# Patient Record
Sex: FEMALE | Race: WHITE | NOT HISPANIC OR LATINO | Employment: OTHER | URBAN - METROPOLITAN AREA
[De-identification: names, ages, dates, MRNs, and addresses within clinical notes are randomized per-mention and may not be internally consistent; named-entity substitution may affect disease eponyms.]

---

## 2020-12-15 ENCOUNTER — OFFICE VISIT (OUTPATIENT)
Dept: FAMILY MEDICINE | Facility: CLINIC | Age: 60
End: 2020-12-15
Payer: COMMERCIAL

## 2020-12-15 ENCOUNTER — PATIENT OUTREACH (OUTPATIENT)
Dept: FAMILY MEDICINE | Facility: CLINIC | Age: 60
End: 2020-12-15

## 2020-12-15 VITALS
DIASTOLIC BLOOD PRESSURE: 98 MMHG | HEART RATE: 82 BPM | WEIGHT: 156.75 LBS | SYSTOLIC BLOOD PRESSURE: 158 MMHG | HEIGHT: 65 IN | BODY MASS INDEX: 26.12 KG/M2 | RESPIRATION RATE: 16 BRPM | TEMPERATURE: 97 F | OXYGEN SATURATION: 98 %

## 2020-12-15 DIAGNOSIS — I10 ESSENTIAL HYPERTENSION: Chronic | ICD-10-CM

## 2020-12-15 DIAGNOSIS — R10.11 RIGHT UPPER QUADRANT ABDOMINAL PAIN: Primary | ICD-10-CM

## 2020-12-15 DIAGNOSIS — E87.6 LOW SERUM POTASSIUM: ICD-10-CM

## 2020-12-15 LAB
ALBUMIN SERPL BCP-MCNC: 4.2 G/DL (ref 3.5–5.2)
ALP SERPL-CCNC: 84 U/L (ref 55–135)
ALT SERPL W/O P-5'-P-CCNC: 17 U/L (ref 10–44)
ANION GAP SERPL CALC-SCNC: 11 MMOL/L (ref 8–16)
AST SERPL-CCNC: 20 U/L (ref 10–40)
BILIRUB SERPL-MCNC: 0.7 MG/DL (ref 0.1–1)
BUN SERPL-MCNC: 10 MG/DL (ref 6–20)
CALCIUM SERPL-MCNC: 9.5 MG/DL (ref 8.7–10.5)
CHLORIDE SERPL-SCNC: 100 MMOL/L (ref 95–110)
CO2 SERPL-SCNC: 28 MMOL/L (ref 23–29)
CREAT SERPL-MCNC: 0.7 MG/DL (ref 0.5–1.4)
EST. GFR  (AFRICAN AMERICAN): >60 ML/MIN/1.73 M^2
EST. GFR  (NON AFRICAN AMERICAN): >60 ML/MIN/1.73 M^2
GLUCOSE SERPL-MCNC: 114 MG/DL (ref 70–110)
POTASSIUM SERPL-SCNC: 4.3 MMOL/L (ref 3.5–5.1)
PROT SERPL-MCNC: 7.6 G/DL (ref 6–8.4)
SODIUM SERPL-SCNC: 139 MMOL/L (ref 136–145)

## 2020-12-15 PROCEDURE — 99999 PR PBB SHADOW E&M-EST. PATIENT-LVL IV: ICD-10-PCS | Mod: PBBFAC,,, | Performed by: FAMILY MEDICINE

## 2020-12-15 PROCEDURE — 99204 PR OFFICE/OUTPT VISIT, NEW, LEVL IV, 45-59 MIN: ICD-10-PCS | Mod: S$PBB,,, | Performed by: FAMILY MEDICINE

## 2020-12-15 PROCEDURE — 80053 COMPREHEN METABOLIC PANEL: CPT

## 2020-12-15 PROCEDURE — 99204 OFFICE O/P NEW MOD 45 MIN: CPT | Mod: S$PBB,,, | Performed by: FAMILY MEDICINE

## 2020-12-15 PROCEDURE — 99999 PR PBB SHADOW E&M-EST. PATIENT-LVL IV: CPT | Mod: PBBFAC,,, | Performed by: FAMILY MEDICINE

## 2020-12-15 RX ORDER — POTASSIUM CHLORIDE 750 MG/1
20 CAPSULE, EXTENDED RELEASE ORAL 2 TIMES DAILY
Qty: 120 CAPSULE | Refills: 11 | Status: SHIPPED | OUTPATIENT
Start: 2020-12-15 | End: 2021-12-15

## 2020-12-15 RX ORDER — DICYCLOMINE HYDROCHLORIDE 20 MG/1
20 TABLET ORAL 3 TIMES DAILY PRN
Qty: 60 TABLET | Refills: 1 | Status: SHIPPED | OUTPATIENT
Start: 2020-12-15 | End: 2020-12-15

## 2020-12-15 RX ORDER — POTASSIUM CHLORIDE 750 MG/1
CAPSULE, EXTENDED RELEASE ORAL
COMMUNITY
Start: 2020-12-04 | End: 2020-12-15 | Stop reason: SDUPTHER

## 2020-12-15 RX ORDER — DICYCLOMINE HYDROCHLORIDE 20 MG/1
20 TABLET ORAL 3 TIMES DAILY PRN
Qty: 60 TABLET | Refills: 1 | Status: SHIPPED | OUTPATIENT
Start: 2020-12-15 | End: 2021-01-14

## 2020-12-15 RX ORDER — METHOCARBAMOL 750 MG/1
TABLET, FILM COATED ORAL
COMMUNITY
Start: 2020-12-04 | End: 2023-08-29 | Stop reason: ALTCHOICE

## 2020-12-15 NOTE — PROGRESS NOTES
Population Health Outreach.  12/15/2020  efax snet to [facility] med red for most recent mammogram & colonoscopy results  [phone]  [phone]  efax sent to gp mem med rec for most recent fasting labs

## 2020-12-15 NOTE — LETTER
FAX      AUTHORIZATION FOR RELEASE OF   CONFIDENTIAL INFORMATION        [facility]      We are seeing Zoë Dupree, date of birth 1960, in the clinic at [facility]. ÓSCAR DOYLE MD is the patient's PCP. Zoë Dupree has an outstanding lab/procedure at the time we reviewed their chart. In order to help keep their health information updated, Zoë Dupree has authorized us to request the following medical record(s):         ( X )  MOST RECENT FASTING LAB RESULTS        Please fax records to [facility]  [phone]     If you have any questions, please contact Nena at [phone].      Nena Baldwin LPN  Performance Improvement Coordinator  [facility] [address] [place] [address] [phone]

## 2020-12-15 NOTE — LETTER
FAX      AUTHORIZATION FOR RELEASE OF   CONFIDENTIAL INFORMATION        [facility]      We are seeing Zoë Dupree, date of birth 1960, in the clinic at [facility]. ÓSCAR DOYLE MD is the patient's PCP. Zoë Dupree has an outstanding lab/procedure at the time we reviewed their chart. In order to help keep their health information updated, Zoë Dupree has authorized us to request the following medical record(s):        ( X )  MAMMOGRAM (WITH IN 2 YRS)          ( X)  COLONOSCOPY (WITH IN 10 YRS)      Please fax records to [facility]  [phone]     If you have any questions, please contact Nena at [phone].      Nena Baldwin LPN  Performance Improvement Coordinator  [facility]  [address] [place] [address], [place] [address] [phone]

## 2020-12-17 ENCOUNTER — TELEPHONE (OUTPATIENT)
Dept: FAMILY MEDICINE | Facility: CLINIC | Age: 60
End: 2020-12-17

## 2020-12-17 DIAGNOSIS — Z12.11 COLON CANCER SCREENING: ICD-10-CM

## 2020-12-17 DIAGNOSIS — Z12.31 OTHER SCREENING MAMMOGRAM: ICD-10-CM

## 2020-12-17 NOTE — TELEPHONE ENCOUNTER
----- Message from Flavia Ratliff sent at 12/17/2020 10:52 AM CST -----  Type:  Patient Returning Call    Who Called:  patient  Who Left Message for Patient:  Nena  Does the patient know what this is regarding?:  yes  Best Call Back Number:  [phone]  Additional Information:  requesting a call back

## 2020-12-29 ENCOUNTER — CLINICAL SUPPORT (OUTPATIENT)
Dept: FAMILY MEDICINE | Facility: CLINIC | Age: 60
End: 2020-12-29
Payer: COMMERCIAL

## 2020-12-29 ENCOUNTER — TELEPHONE (OUTPATIENT)
Dept: FAMILY MEDICINE | Facility: CLINIC | Age: 60
End: 2020-12-29

## 2020-12-29 VITALS — SYSTOLIC BLOOD PRESSURE: 140 MMHG | DIASTOLIC BLOOD PRESSURE: 90 MMHG

## 2020-12-29 PROCEDURE — 99999 PR PBB SHADOW E&M-EST. PATIENT-LVL I: CPT | Mod: PBBFAC,,,

## 2020-12-29 PROCEDURE — 99999 PR PBB SHADOW E&M-EST. PATIENT-LVL I: ICD-10-PCS | Mod: PBBFAC,,,

## 2020-12-29 NOTE — TELEPHONE ENCOUNTER
----- Message from Theresa Mireles sent at 12/29/2020  8:55 AM CST -----  Regarding: reschedule nurse visit  Contact: patient  Type:   Appointment Request      Name of Caller: patient    Best Call Back Number: [phone]    Additional Information:     Patient has nurse visit today at 9 am- has contractor coming,- would like to come by around 10 or 11 for bp check.   Called around 8:50 to change appt.

## 2020-12-29 NOTE — PROGRESS NOTES
Zoë Dupree 60 y.o. female is here today for Blood Pressure check.   History of HTN:yes     Review of patient's allergies indicates:  No Known Allergies  Creatinine   Date Value Ref Range Status   12/15/2020 0.7 0.5 - 1.4 mg/dL Final     Sodium   Date Value Ref Range Status   12/15/2020 139 136 - 145 mmol/L Final     Potassium   Date Value Ref Range Status   12/15/2020 4.3 3.5 - 5.1 mmol/L Final       Current Outpatient Medications:     dicyclomine (BENTYL) 20 mg tablet, Take 1 tablet (20 mg total) by mouth 3 (three) times daily as needed., Disp: 60 tablet, Rfl: 1    methocarbamoL (ROBAXIN) 750 MG Tab, TK 1 T PO  Q 4 H FOR 7 DAYS PRN P, Disp: , Rfl:     potassium chloride (MICRO-K) 10 MEQ CpSR, Take 2 capsules (20 mEq total) by mouth 2 (two) times daily., Disp: 120 capsule, Rfl: 11    Pt home bp readings:  12/22/20 148/84 (AM)       141/88(PM)    12/23/20 157/99(AM)         144/91(PM)    12/24/20 150/114(AM)       151/92(PM)    12/25/20 154/96(AM)       132/77(PM)    12/27/20 149/96(AM)      136/77(PM)    12/28/20 149/96(AM)      BP: (!) 140/90   Pt is asymptomatic.      Blood pressure reading after 15 minutes was 140/90.  Dr. Ferrari notified.

## 2020-12-30 ENCOUNTER — PATIENT MESSAGE (OUTPATIENT)
Dept: FAMILY MEDICINE | Facility: CLINIC | Age: 60
End: 2020-12-30

## 2020-12-30 ENCOUNTER — TELEPHONE (OUTPATIENT)
Dept: FAMILY MEDICINE | Facility: CLINIC | Age: 60
End: 2020-12-30

## 2020-12-30 PROBLEM — I10 ESSENTIAL HYPERTENSION: Status: ACTIVE | Noted: 2020-12-30

## 2020-12-30 RX ORDER — LOSARTAN POTASSIUM 50 MG/1
50 TABLET ORAL DAILY
COMMUNITY
End: 2021-01-04

## 2020-12-30 RX ORDER — OMEPRAZOLE 20 MG/1
20 CAPSULE, DELAYED RELEASE ORAL DAILY
COMMUNITY
End: 2023-08-29 | Stop reason: SDUPTHER

## 2020-12-30 NOTE — TELEPHONE ENCOUNTER
----- Message from Zara Cain sent at 12/29/2020  4:32 PM CST -----  Regarding: Pt Advice (Medication Change)  Contact: Patient  Type: Needs Medical Advice  Who Called: Patient  Best Call Back Number: [phone]  Additional Information: Patient stated she was seen this morning 12/29/20 due to High Blood Pressure and the patient is stating Dr. Ferrari was to call her and change her Medications due to her High Blood Pressure. Patient is concerned and would like a call back.

## 2021-01-04 ENCOUNTER — PATIENT OUTREACH (OUTPATIENT)
Dept: ADMINISTRATIVE | Facility: HOSPITAL | Age: 61
End: 2021-01-04

## 2021-01-04 ENCOUNTER — HOSPITAL ENCOUNTER (OUTPATIENT)
Dept: RADIOLOGY | Facility: HOSPITAL | Age: 61
Discharge: HOME | End: 2021-01-04
Attending: FAMILY MEDICINE
Payer: COMMERCIAL

## 2021-01-04 ENCOUNTER — OFFICE VISIT (OUTPATIENT)
Dept: FAMILY MEDICINE | Facility: CLINIC | Age: 61
End: 2021-01-04
Payer: COMMERCIAL

## 2021-01-04 VITALS
DIASTOLIC BLOOD PRESSURE: 100 MMHG | HEIGHT: 65 IN | WEIGHT: 157.63 LBS | HEART RATE: 84 BPM | TEMPERATURE: 98 F | RESPIRATION RATE: 15 BRPM | BODY MASS INDEX: 26.26 KG/M2 | SYSTOLIC BLOOD PRESSURE: 148 MMHG | OXYGEN SATURATION: 98 %

## 2021-01-04 DIAGNOSIS — E87.6 LOW SERUM POTASSIUM: ICD-10-CM

## 2021-01-04 DIAGNOSIS — I10 ESSENTIAL HYPERTENSION: Chronic | ICD-10-CM

## 2021-01-04 DIAGNOSIS — M79.10 MYALGIA: Primary | Chronic | ICD-10-CM

## 2021-01-04 DIAGNOSIS — R10.11 RIGHT UPPER QUADRANT ABDOMINAL PAIN: ICD-10-CM

## 2021-01-04 DIAGNOSIS — R10.11 RIGHT UPPER QUADRANT ABDOMINAL PAIN: Primary | ICD-10-CM

## 2021-01-04 DIAGNOSIS — R10.11 RIGHT UPPER QUADRANT PAIN: ICD-10-CM

## 2021-01-04 PROBLEM — M81.0 OSTEOPOROSIS: Status: ACTIVE | Noted: 2020-08-01

## 2021-01-04 PROCEDURE — 99999 PR PBB SHADOW E&M-EST. PATIENT-LVL IV: ICD-10-PCS | Mod: PBBFAC,,, | Performed by: FAMILY MEDICINE

## 2021-01-04 PROCEDURE — 99214 PR OFFICE/OUTPT VISIT, EST, LEVL IV, 30-39 MIN: ICD-10-PCS | Mod: S$GLB,,, | Performed by: FAMILY MEDICINE

## 2021-01-04 PROCEDURE — 76705 US ABDOMEN LIMITED_GALLBLADDER: ICD-10-PCS | Mod: 26,,, | Performed by: RADIOLOGY

## 2021-01-04 PROCEDURE — 99214 OFFICE O/P EST MOD 30 MIN: CPT | Mod: S$GLB,,, | Performed by: FAMILY MEDICINE

## 2021-01-04 PROCEDURE — 99999 PR PBB SHADOW E&M-EST. PATIENT-LVL IV: CPT | Mod: PBBFAC,,, | Performed by: FAMILY MEDICINE

## 2021-01-04 PROCEDURE — 76705 ECHO EXAM OF ABDOMEN: CPT | Mod: TC

## 2021-01-04 PROCEDURE — 76705 ECHO EXAM OF ABDOMEN: CPT | Mod: 26,,, | Performed by: RADIOLOGY

## 2021-01-04 RX ORDER — LOSARTAN POTASSIUM 100 MG/1
100 TABLET ORAL NIGHTLY
Qty: 30 TABLET | Refills: 2 | Status: SHIPPED | OUTPATIENT
Start: 2021-01-04 | End: 2021-04-10

## 2021-01-05 ENCOUNTER — LAB VISIT (OUTPATIENT)
Dept: FAMILY MEDICINE | Facility: CLINIC | Age: 61
End: 2021-01-05
Payer: COMMERCIAL

## 2021-01-05 ENCOUNTER — PATIENT OUTREACH (OUTPATIENT)
Dept: ADMINISTRATIVE | Facility: HOSPITAL | Age: 61
End: 2021-01-05

## 2021-01-05 DIAGNOSIS — M79.10 MYALGIA: ICD-10-CM

## 2021-01-05 DIAGNOSIS — E87.6 LOW SERUM POTASSIUM: ICD-10-CM

## 2021-01-05 PROBLEM — E83.42 HYPOMAGNESEMIA: Status: ACTIVE | Noted: 2021-01-05

## 2021-01-05 LAB
ALBUMIN SERPL BCP-MCNC: 3.9 G/DL (ref 3.5–5.2)
ALP SERPL-CCNC: 78 U/L (ref 55–135)
ALT SERPL W/O P-5'-P-CCNC: 15 U/L (ref 10–44)
ANION GAP SERPL CALC-SCNC: 8 MMOL/L (ref 8–16)
AST SERPL-CCNC: 20 U/L (ref 10–40)
BASOPHILS # BLD AUTO: 0.05 K/UL (ref 0–0.2)
BASOPHILS NFR BLD: 0.8 % (ref 0–1.9)
BILIRUB SERPL-MCNC: 0.6 MG/DL (ref 0.1–1)
BUN SERPL-MCNC: 10 MG/DL (ref 6–20)
CALCIUM SERPL-MCNC: 8.7 MG/DL (ref 8.7–10.5)
CHLORIDE SERPL-SCNC: 104 MMOL/L (ref 95–110)
CO2 SERPL-SCNC: 28 MMOL/L (ref 23–29)
CREAT SERPL-MCNC: 0.7 MG/DL (ref 0.5–1.4)
DIFFERENTIAL METHOD: NORMAL
EOSINOPHIL # BLD AUTO: 0.1 K/UL (ref 0–0.5)
EOSINOPHIL NFR BLD: 1.1 % (ref 0–8)
ERYTHROCYTE [DISTWIDTH] IN BLOOD BY AUTOMATED COUNT: 12.8 % (ref 11.5–14.5)
EST. GFR  (AFRICAN AMERICAN): >60 ML/MIN/1.73 M^2
EST. GFR  (NON AFRICAN AMERICAN): >60 ML/MIN/1.73 M^2
GLUCOSE SERPL-MCNC: 112 MG/DL (ref 70–110)
HCT VFR BLD AUTO: 44.7 % (ref 37–48.5)
HGB BLD-MCNC: 15 G/DL (ref 12–16)
IMM GRANULOCYTES # BLD AUTO: 0.01 K/UL (ref 0–0.04)
IMM GRANULOCYTES NFR BLD AUTO: 0.2 % (ref 0–0.5)
IRON SERPL-MCNC: 107 UG/DL (ref 30–160)
LYMPHOCYTES # BLD AUTO: 2.2 K/UL (ref 1–4.8)
LYMPHOCYTES NFR BLD: 33.3 % (ref 18–48)
MAGNESIUM SERPL-MCNC: 0.8 MG/DL (ref 1.6–2.6)
MCH RBC QN AUTO: 30.4 PG (ref 27–31)
MCHC RBC AUTO-ENTMCNC: 33.6 G/DL (ref 32–36)
MCV RBC AUTO: 91 FL (ref 82–98)
MONOCYTES # BLD AUTO: 0.4 K/UL (ref 0.3–1)
MONOCYTES NFR BLD: 6.5 % (ref 4–15)
NEUTROPHILS # BLD AUTO: 3.8 K/UL (ref 1.8–7.7)
NEUTROPHILS NFR BLD: 58.1 % (ref 38–73)
NRBC BLD-RTO: 0 /100 WBC
PLATELET # BLD AUTO: 217 K/UL (ref 150–350)
PMV BLD AUTO: 11.3 FL (ref 9.2–12.9)
POTASSIUM SERPL-SCNC: 4 MMOL/L (ref 3.5–5.1)
PROT SERPL-MCNC: 7.3 G/DL (ref 6–8.4)
RBC # BLD AUTO: 4.94 M/UL (ref 4–5.4)
SATURATED IRON: 23 % (ref 20–50)
SODIUM SERPL-SCNC: 140 MMOL/L (ref 136–145)
TOTAL IRON BINDING CAPACITY: 471 UG/DL (ref 250–450)
TRANSFERRIN SERPL-MCNC: 318 MG/DL (ref 200–375)
WBC # BLD AUTO: 6.51 K/UL (ref 3.9–12.7)

## 2021-01-05 PROCEDURE — 85025 COMPLETE CBC W/AUTO DIFF WBC: CPT

## 2021-01-05 PROCEDURE — 83735 ASSAY OF MAGNESIUM: CPT

## 2021-01-05 PROCEDURE — 80053 COMPREHEN METABOLIC PANEL: CPT

## 2021-01-05 PROCEDURE — 83540 ASSAY OF IRON: CPT

## 2021-01-14 ENCOUNTER — TELEPHONE (OUTPATIENT)
Dept: FAMILY MEDICINE | Facility: CLINIC | Age: 61
End: 2021-01-14

## 2021-02-10 ENCOUNTER — TELEPHONE (OUTPATIENT)
Dept: FAMILY MEDICINE | Facility: CLINIC | Age: 61
End: 2021-02-10

## 2021-03-04 DIAGNOSIS — Z11.59 NEED FOR HEPATITIS C SCREENING TEST: ICD-10-CM

## 2021-03-11 ENCOUNTER — PATIENT OUTREACH (OUTPATIENT)
Dept: ADMINISTRATIVE | Facility: HOSPITAL | Age: 61
End: 2021-03-11

## 2021-09-30 ENCOUNTER — PATIENT OUTREACH (OUTPATIENT)
Dept: FAMILY MEDICINE | Facility: CLINIC | Age: 61
End: 2021-09-30

## 2021-12-16 ENCOUNTER — PATIENT OUTREACH (OUTPATIENT)
Dept: FAMILY MEDICINE | Facility: CLINIC | Age: 61
End: 2021-12-16
Payer: COMMERCIAL

## 2022-01-12 DIAGNOSIS — I10 ESSENTIAL HYPERTENSION: ICD-10-CM

## 2022-02-23 DIAGNOSIS — Z12.31 OTHER SCREENING MAMMOGRAM: ICD-10-CM

## 2022-02-24 ENCOUNTER — TELEPHONE (OUTPATIENT)
Dept: FAMILY MEDICINE | Facility: CLINIC | Age: 62
End: 2022-02-24
Payer: COMMERCIAL

## 2022-02-24 NOTE — TELEPHONE ENCOUNTER
Attempt to reach patient per telephone regarding elevated B/P at last visit with PCP, Number in chart not a working raqueler. KJ

## 2022-05-11 DIAGNOSIS — Z11.59 NEED FOR HEPATITIS C SCREENING TEST: ICD-10-CM

## 2022-06-04 ENCOUNTER — TELEPHONE ENCOUNTER (OUTPATIENT)
Dept: URBAN - METROPOLITAN AREA CLINIC 68 | Facility: CLINIC | Age: 62
End: 2022-06-04

## 2022-06-05 ENCOUNTER — TELEPHONE ENCOUNTER (OUTPATIENT)
Dept: URBAN - METROPOLITAN AREA CLINIC 68 | Facility: CLINIC | Age: 62
End: 2022-06-05

## 2022-06-25 ENCOUNTER — TELEPHONE ENCOUNTER (OUTPATIENT)
Age: 62
End: 2022-06-25

## 2022-06-26 ENCOUNTER — TELEPHONE ENCOUNTER (OUTPATIENT)
Age: 62
End: 2022-06-26

## 2023-02-15 DIAGNOSIS — M21.41 FLAT FOOT (PES PLANUS) (ACQUIRED), RIGHT FOOT: ICD-10-CM

## 2023-02-15 DIAGNOSIS — M22.2X1 PATELLOFEMORAL DISORDERS, RIGHT KNEE: Primary | ICD-10-CM

## 2023-02-28 ENCOUNTER — CLINICAL SUPPORT (OUTPATIENT)
Dept: REHABILITATION | Facility: HOSPITAL | Age: 63
End: 2023-02-28
Payer: COMMERCIAL

## 2023-02-28 DIAGNOSIS — M22.2X1 PATELLOFEMORAL DISORDERS, RIGHT KNEE: ICD-10-CM

## 2023-02-28 DIAGNOSIS — M21.41 FLAT FOOT (PES PLANUS) (ACQUIRED), RIGHT FOOT: Primary | ICD-10-CM

## 2023-02-28 PROCEDURE — 97161 PT EVAL LOW COMPLEX 20 MIN: CPT | Mod: PN

## 2023-02-28 PROCEDURE — 97112 NEUROMUSCULAR REEDUCATION: CPT | Mod: PN

## 2023-02-28 NOTE — PROGRESS NOTES
PT/PTA met face to face to discuss pt's treatment plan and progress towards established goals. Pt will be seen by a physical therapist minimally every 6th visit or every 30 days.     Please see Plan of Care dated 2/28/23 for goals.     * bilateral knee / foot - Alternate Land-based and aquatic therapy sessions;  Generalized BLE strength, motor control/stability, and standing balance (No eyes closed, No single limb stance);  Ankle ROM (especially DF), foot intrinsics, gastroc/soleus flexibility, eccentric quad control;     SYDNEE Mercer, PT

## 2023-02-28 NOTE — PLAN OF CARE
[facility]  Physical Therapy Initial Evaluation    Name: Zoë Dupree  Clinic Number: [id number]    Therapy Diagnosis:   Encounter Diagnoses   Name Primary?    Patellofemoral disorders, right knee     Flat foot (pes planus) (acquired), right foot Yes     Physician: Stoney Bowen NP    Physician Orders: PT Eval and Treat   Medical Diagnosis from Referral:   M22.2X1 (ICD-10-CM) - Patellofemoral disorders, right knee   M21.41 (ICD-10-CM) - Flat foot (pes planus) (acquired), right foot   Evaluation Date: 2/28/2023  Authorization Period Expiration: 12/31/2023   Plan of Care Expiration: 5/28/23  Visit # / Visits authorized: 1 / 1  FOTO Visit #:  1/3    Time In: 2:32PM  Time Out: 3:28PM  Total Appointment Time (timed & untimed codes): 53 minutes    Precautions: Standard, Fall, and Vestibular dysfunction - No balance with eyes closed or single limb    Subjective   Date of onset: 09/2022  History of current condition - Zoë reports: I moved into an upstairs apartment about 5months ago, and that's when I started having knee pain. When I went to the doctor, he said the knee pain was due to my fallen arches. The knee pain isn't the most limiting - It's the pain in my feet (R>L) and my balance. History of vestibular vertigo. When I walk, if I step on even a small pebble, it's enough to throw me off balance. Over the past few years, i've had several falls and ankle sprains which has affected the balance and strength. Two years ago, I had an awful fall and that put me down for the year. I think that is what caused the arch problem.      Medical History:   Past Medical History:   Diagnosis Date    Hypertension     Osteoporosis 08/2020    Tietze syndrome      Surgical History:   Zoë Dupree  has a past surgical history that includes Thyroidectomy, partial; breast implants; Cosmetic surgery; and Total abdominal hysterectomy w/ bilateral salpingoophorectomy.    Medications:   Zoë has a current medication list  which includes the following prescription(s): losartan, methocarbamol, omeprazole, and potassium chloride.    Allergies:   Review of patient's allergies indicates:   Allergen Reactions    Reglan [metoclopramide hcl] Palpitations      Imaging, None    Prior Therapy: 2 years ago for ankles and balance  Social History: 2nd floor apartment   Prior Level of Function: Independent  Current Level of Function: Independent    Pain:  Current 0/10, worst 3/10, best 0/10   Location: Top of the right foot; bilateral knees  Description: sharp, stabbing on foot; dull, ache at knees  Aggravating Factors: up/down stairs (especially descending), walking down slopes, uneven surfaces, and increased walking  Easing Factors: Activity modification    Pts goals: To get enough strength to maintain mobility and improve walking    Objective     Observation: Decreased arch height in standing (Slight navicular drop); Bilateral calcaneal valgus    Palpation: Tenderness at bilateral plantar fascia and R>L anterior tib tendon    Joint Mobility: Decreased posterior talar glide, bilaterally    KNEE ROM (Active / Passive)    Left Right  Flexion -------- 130 130  Extension ---- 0 0    ANKLE ROM (Active / Passive)    Left  Right  Plantarflexion  50/55  55  Dorsiflexion -- -2/4  0/6  Inversion ------ 30/35  30/35  Eversion ------ 10/20  10/15    Lower Extremity MMT    Left Right  HIP  Flexion -------- 4- 4    Extension ---- 4 4-    Abduction ---- 4 4   Adduction ---- 5 4+    Int Rot --------- 3+ 4  Ext Rot -------- 3+ 4-    KNEE  Flexion -------- 5 5  Extension ---- 4 4+  VMO Quality  Fair Fair  ANKLE  Plantarflexion  3 3  Dorsiflexion -- 5 5  Inversion 4- 4   Eversion 3+ 4-    Great Toe Flexion/Extension Right weaker than Left     Standing Balance:  Narrow Base of Support  Static:  Eyes Open = 30seconds   Eyes Closed = 4seconds    Semi-Tandem Base of Support  Eyes Open  Left Foot Forward = 4sec  Right Foot Forward = 30sec    Tandem Base of  Support  Eyes Open  Left Foot Forward = 8seconds  Right Foot Forward = 3seconds    Limitation/Restriction for FOTO Ankle Survey    Therapist reviewed FOTO scores for Zoë Dupree on 2/28/2023.   FOTO documents entered into EPIC - see Media section.    Limitation Score: 65%         TREATMENT   Treatment Time In: 3:20PM  Treatment Time Out: 3:28PM  Total Treatment time (time-based codes) separate from Evaluation: 8 minutes    Zoë received the treatments listed below:    NEUROMUSCULAR RE-EDUCATION ACTIVITIES to improve Balance, Coordination, Kinesthetic, Sense, Proprioception, and Posture for 8 minutes.  The following were included: Arch Raises, great toe extension lifts, great toe flexion;.    Home Exercises and Patient Education Provided    Education provided:   - PT POC and HEP    Written Home Exercises Provided: yes.  Exercises were reviewed and Zoë was able to demonstrate them prior to the end of the session.  Zoë demonstrated good  understanding of the education provided.     See EMR under Patient Instructions for exercises provided 2/28/2023.    Assessment   Zoë is a 63 y.o. female referred to outpatient Physical Therapy with a medical diagnosis of M22.2X1 (ICD-10-CM) - Patellofemoral disorders, right knee M21.41 (ICD-10-CM) - Flat foot (pes planus) (acquired), right foot. Pt presents with complaints of foot and knee pain with gait tasks. Evaluation findings reveal postural deviations, ankle ROM limitations, tissue extensibility deficits, impaired standing balance, and BLE strength deficits. These deficits affect patient's ability to perform ADLs and functional mobility at prior level of function.     Pt prognosis is Good.   Pt will benefit from skilled outpatient Physical Therapy to address the deficits stated above and in the chart below, provide pt/family education, and to maximize pt's level of independence.     Plan of care discussed with patient: Yes  Pt's spiritual, cultural and educational needs  "considered and patient is agreeable to the plan of care and goals as stated below:     Anticipated Barriers for therapy: None    Medical Necessity is demonstrated by the following  History  Co-morbidities and personal factors that may impact the plan of care Co-morbidities:   HTN and osteoporosis    Personal Factors:   no deficits     low   Examination  Body Structures and Functions, activity limitations and participation restrictions that may impact the plan of care Body Regions:   lower extremities    Body Systems:    gross symmetry  ROM  strength  gross coordinated movement  balance  gait  transfers  motor control  motor learning    Participation Restrictions:   None    Activity limitations:   Learning and applying knowledge  no deficits    General Tasks and Commands  no deficits    Communication  no deficits    Mobility  walking    Self care  looking after one's health    Domestic Life  shopping  doing house work (cleaning house, washing dishes, laundry)  assisting others    Interactions/Relationships  no deficits    Life Areas  no deficits    Community and Social Life  no deficits         low   Clinical Presentation stable and uncomplicated low   Decision Making/ Complexity Score: low     Goals:  Short Term Goals: 4 weeks   1. Report decreased pain  < / =  2/10 "At Worst" to increase tolerance for ADLs  2. Demonstrate Ankle dorsiflexion >/= 5 degrees actively and >/= 12 degrees passively.  3. Demonstrate VMO contraction quality of Good to demonstrate joint stability.  4. Increase LE strength to >/= 4/5 MMT grade in BLE to increase tolerance for ADL and work activities.  5. Pt to tolerate HEP to improve ROM and independence with ADL's    Long Term Goals: 8 weeks   1. Increase strength to >/= 4+/5 in BLE to increase tolerance for ADL and work activities.  2. Demonstrate ability to negotiate stairs and perform community ambulation without instability or limitation due to knee/ankle pain.  3. Patient goal: To get " enough strength to maintain mobility and improve walking  4. Pt will score </= 50% limitation on FOTO to demonstrate increase in LE function and mobility in home and community environment.     Plan   Plan of care Certification: 2/28/2023 to 5/28/23.    Outpatient Physical Therapy 2 times weekly for 8 weeks to include the following interventions: Aquatic Therapy, Gait Training, Manual Therapy, Moist Heat/ Ice, Neuromuscular Re-ed, Patient Education, Self Care, and Therapeutic Activities.    [employer] ([id number]) Not Approved     Diane Mercer, PT

## 2023-03-06 NOTE — PROGRESS NOTES
[facility]   Physical Therapy Treatment Note     Name: Zoë Dupree  Clinic Number: [id number]    Therapy Diagnosis:   Encounter Diagnoses   Name Primary?    Patellofemoral disorders, right knee Yes    Flat foot (pes planus) (acquired), right foot      Physician: Stoney Bowen NP    Visit Date: 3/7/2023    Physician: Stoney Bowen NP     Physician Orders: PT Eval and Treat   Medical Diagnosis from Referral:   M22.2X1 (ICD-10-CM) - Patellofemoral disorders, right knee   M21.41 (ICD-10-CM) - Flat foot (pes planus) (acquired), right foot   Evaluation Date: 2/28/2023  Authorization Period Expiration: 12/31/2023   Plan of Care Expiration: 5/28/23  Visit # / Visits authorized: 1 / 8 (Not including Initial Evaluation)  FOTO Visit #:  1/3     PTA Visit #: 0/5     Time In: 2:35 PM  Time Out: 3:29 PM  Total Billable Time: 53 minutes    Precautions: Standard, Fall, and Vestibular dysfunction - No balance with eyes closed or single limb    SUBJECTIVE     Pt reports: No new complaints.     She was compliant with home exercise program.  Response to previous treatment: No adverse response to previous session.  Functional change: None at this time.    Pain: 0/10  Location: Top of the right foot; bilateral knees  Description: sharp, stabbing on foot; dull, ache at knees  Aggravating Factors: up/down stairs (especially descending), walking down slopes, uneven surfaces, and increased walking     Pts goals: To get enough strength to maintain mobility and improve walking    OBJECTIVE     Objective Measures updated at progress report unless specified.     Treatment     Zoë received the treatments listed below:      Therapeutic Exercises to develop strength, endurance, ROM, flexibility, posture, and core stabilization for - minutes including:    Aquatic Exercises to develop strength, endurance, ROM, flexibility, posture, and core stabilization for 53 minutes including:  Forward Walking x7mins  Retro Walking  x5mins  Lateral Stepping Right/Left x5mins   [place] length of pool (~20feet) x5 rounds R/L  Calf stretch on wall [id number] R/L  Bilateral Heel Raises 1x20  Hip Flexion with kickboard for unstable BUE support, 1x20 R/L  Hip Abduction with kickboard support, 1x20 R/L  Hip Extension with kickboard support, 1x20 R/L  Hip Circles CW/CCW with stable UE support, 15x R/L in each direction  Hamstring Curls with stable BUE support, 20x R/L  Knee extensions (with hip flexed to 90deg) with stable UE support, 20x R/L  Mini Squat with kickboard support, 1x20  Hamstring stretch [id number] R/L  Quad stretch [id number] R/L    Neuromuscular Re-education activities to improve: Balance, Coordination, Kinesthetic, Sense, Proprioception, and Posture for - minutes. The following activities were included:    Manual Therapy Techniques: - were applied to the: - for - minutes, including:    Therapeutic Activities to improve functional performance for - minutes, including:    Gait Training to improve functional mobility and safety for - minutes, including:    Direct Contact Modalities after being cleared for contraindications:     Supervised Modalities after being cleared for contradictions:     hot pack for - minutes to -.    cold pack for - minutes to -.    Patient Education and Home Exercises     Home Exercises Provided and Patient Education Provided     Education provided:   - HEP Review: Arch Raises, great toe extension lifts, great toe flexion;    Written Home Exercises Provided: Patient instructed to cont prior HEP. Exercises were reviewed and Zoë was able to demonstrate them prior to the end of the session.  Zoë demonstrated good  understanding of the education provided. See EMR under Patient Instructions for exercises provided during therapy sessions    ASSESSMENT   Zoë is a 63 y.o. female referred to outpatient Physical Therapy with a medical diagnosis of M22.2X1 (ICD-10-CM) - Patellofemoral disorders, right knee M21.41 (ICD-10-CM) -  "Flat foot (pes planus) (acquired), right foot. Pt presents with complaints of foot and knee pain with gait tasks.     Patient demonstrates good tolerance with today's session. Session today performed in pool, per MD order. Interventions focused on dynamic gait, along with BLE mobility, flexibility and strength. Dynamic gait tasks allow for challenges in frontal and sagittal plane, along with [place] incorporating the transverse plane. These were well tolerated. Patient notes moderate calf (L>R) and hamstring (R>L) tightness. To further challenge stability with LE motion, kickboard was utilized for unstable UE support. Patient notes greater instability on LLE versus RLE. Left low back symptom provocation noted with left hip extension - Visual and verbal cues provided to ensure proper technique without compensatory lumbar extension. Patient demonstrates good understanding.     Patient remains with postural deviations, ankle ROM limitations, tissue extensibility deficits, impaired standing balance, and BLE strength deficits. These deficits affect patient's ability to perform ADLs and functional mobility at prior level of function.     Zoë Is progressing well towards her goals.   Pt prognosis is Good.     Pt will continue to benefit from skilled outpatient physical therapy to address the deficits listed in the problem list box on initial evaluation, provide pt/family education and to maximize pt's level of independence in the home and community environment.     Pt's spiritual, cultural and educational needs considered and pt agreeable to plan of care and goals.     Anticipated barriers to physical therapy: None    Goals:  Short Term Goals: 4 weeks   1. Report decreased pain  < / =  2/10 "At Worst" to increase tolerance for ADLs  2. Demonstrate Ankle dorsiflexion >/= 5 degrees actively and >/= 12 degrees passively.  3. Demonstrate VMO contraction quality of Good to demonstrate joint stability.  4. Increase LE strength " to >/= 4/5 MMT grade in BLE to increase tolerance for ADL and work activities.  5. Pt to tolerate HEP to improve ROM and independence with ADL's     Long Term Goals: 8 weeks   1. Increase strength to >/= 4+/5 in BLE to increase tolerance for ADL and work activities.  2. Demonstrate ability to negotiate stairs and perform community ambulation without instability or limitation due to knee/ankle pain.  3. Patient goal: To get enough strength to maintain mobility and improve walking  4. Pt will score </= 50% limitation on FOTO to demonstrate increase in LE function and mobility in home and community environment.     PLAN     Advance patient per PT POC, as tolerated.  * bilateral knee / foot - Alternate Land-based and aquatic therapy sessions;  Generalized BLE strength, motor control/stability, and standing balance (No eyes closed, No single limb stance);  Ankle ROM (especially DF), foot intrinsics, gastroc/soleus flexibility, eccentric quad control;     Plan of care Certification: 2/28/2023 to 5/28/23.     Outpatient Physical Therapy 2 times weekly for 8 weeks to include the following interventions: Aquatic Therapy, Therapeutic Exercise, Gait Training, Manual Therapy, Moist Heat/ Ice, Neuromuscular Re-ed, Patient Education, Self Care, and Therapeutic Activities.    Diane Mercer, PT

## 2023-03-07 ENCOUNTER — CLINICAL SUPPORT (OUTPATIENT)
Dept: REHABILITATION | Facility: HOSPITAL | Age: 63
End: 2023-03-07
Payer: COMMERCIAL

## 2023-03-07 DIAGNOSIS — M21.41 FLAT FOOT (PES PLANUS) (ACQUIRED), RIGHT FOOT: ICD-10-CM

## 2023-03-07 DIAGNOSIS — M22.2X1 PATELLOFEMORAL DISORDERS, RIGHT KNEE: Primary | ICD-10-CM

## 2023-03-07 PROCEDURE — 97113 AQUATIC THERAPY/EXERCISES: CPT | Mod: PN

## 2023-03-09 ENCOUNTER — CLINICAL SUPPORT (OUTPATIENT)
Dept: REHABILITATION | Facility: HOSPITAL | Age: 63
End: 2023-03-09
Payer: COMMERCIAL

## 2023-03-09 DIAGNOSIS — M22.2X1 PATELLOFEMORAL DISORDERS, RIGHT KNEE: Primary | ICD-10-CM

## 2023-03-09 DIAGNOSIS — M21.41 FLAT FOOT (PES PLANUS) (ACQUIRED), RIGHT FOOT: ICD-10-CM

## 2023-03-09 PROCEDURE — 97110 THERAPEUTIC EXERCISES: CPT | Mod: PN

## 2023-03-09 NOTE — PROGRESS NOTES
[facility]   Physical Therapy Treatment Note     Name: Zoë Dupree  Clinic Number: [id number]    Therapy Diagnosis:   Encounter Diagnoses   Name Primary?    Patellofemoral disorders, right knee Yes    Flat foot (pes planus) (acquired), right foot      Physician: Stoney Bowen NP    Visit Date: 3/9/2023    Physician: Stoney Bowen NP     Physician Orders: PT Eval and Treat   Medical Diagnosis from Referral:   M22.2X1 (ICD-10-CM) - Patellofemoral disorders, right knee   M21.41 (ICD-10-CM) - Flat foot (pes planus) (acquired), right foot   Evaluation Date: 2/28/2023  Authorization Period Expiration: 12/31/2023   Plan of Care Expiration: 5/28/23  Visit # / Visits authorized: 2 / 8 (Not including Initial Evaluation)  FOTO Visit #:  1/3     PTA Visit #: 0/5     Time In: 11:59 AM  Time Out: 12:54 PM  Total Billable Time: 53 minutes    Precautions: Standard, Fall, and Vestibular dysfunction - No balance with eyes closed or single limb    SUBJECTIVE     Pt reports: I felt great following the pool work out.     She was compliant with home exercise program.  Response to previous treatment: No adverse response to previous session.  Functional change: None at this time.    Pain: 0/10  Location: Top of the right foot; bilateral knees  Description: sharp, stabbing on foot; dull, ache at knees  Aggravating Factors: up/down stairs (especially descending), walking down slopes, uneven surfaces, and increased walking     Pts goals: To get enough strength to maintain mobility and improve walking    OBJECTIVE     Objective Measures updated at progress report unless specified.     ANKLE ROM (Active / Passive)                          Left                  Right  Plantarflexion  50/55               55  Dorsiflexion --  -2/4                  0/6  Inversion ------ 30/35               30/35  Eversion ------  10/20               10/15    Lower Extremity MMT                          Left      Right  HIP  Flexion  --------  4-         4                        Extension ----  4          4-                       Abduction ----  4          4            Adduction ----  5          4+                      Int Rot ---------  3+        4  Ext Rot --------  3+        4-                       KNEE  Extension ----  4          4+  VMO Quality    Fair      Fair  ANKLE  Plantarflexion  3          3  Inversion         4-         4            Eversion          3+        4-     Great Toe Flexion/Extension Right weaker than Left       Treatment     Zoë received the treatments listed below:      Therapeutic Exercises to develop strength, endurance, ROM, flexibility, posture, and core stabilization for 53 minutes including:  NuStep, Level 3 [id number]  Self-soft tissue mobilization of Plantar Fascia using tennis ball R/L x2mins (R>L tenderness)  Passive DF [id number] R/L  Plantar fascia stretch [id number] R/L  Gastroc stretch on foam wedge with compression wrap, Total of [id number] (2sets with wrap, 2sets without)  Soleus stretch  with compression wrap, [id number] holds  Standing Calf Raises, 2x20 (1set performed R/L with compression wrap)  Seated Bilateral Towel scrunches 1x20  Seated heel raise with tennis ball hold at heels 1x20  Seated Great toe extension lifts 1x20  Seated Great toe flexion with 2-5 remaining in extension, 1x20;  Seated Alternating Toe Taps, Great toe and 5th, 1x15  Arch Raises [id number] of work and education       Aquatic Exercises to develop strength, endurance, ROM, flexibility, posture, and core stabilization for - minutes including:  Forward Walking x7mins  Retro Walking x5mins  Lateral Stepping Right/Left x5mins   [place] length of pool (~20feet) x5 rounds R/L  Calf stretch on wall [id number] R/L  Bilateral Heel Raises 1x20  Hip Flexion with kickboard for unstable BUE support, 1x20 R/L  Hip Abduction with kickboard support, 1x20 R/L  Hip Extension with kickboard support, 1x20 R/L  Hip Circles CW/CCW with stable UE support, 15x  R/L in each direction  Hamstring Curls with stable BUE support, 20x R/L  Knee extensions (with hip flexed to 90deg) with stable UE support, 20x R/L  Mini Squat with kickboard support, 1x20  Hamstring stretch [id number] R/L  Quad stretch [id number] R/L    Manual Therapy Techniques: - were applied to the: - for - minutes, including:    Talocrural Mos    Neuromuscular Re-education activities to improve: Balance, Coordination, Kinesthetic, Sense, Proprioception, and Posture for - minutes. The following activities were included:    Therapeutic Activities to improve functional performance for - minutes, including:    Gait Training to improve functional mobility and safety for - minutes, including:    Direct Contact Modalities after being cleared for contraindications:     Supervised Modalities after being cleared for contradictions:     hot pack for - minutes to -.    cold pack for - minutes to -.    Patient Education and Home Exercises     Home Exercises Provided and Patient Education Provided     Education provided:   - HEP Review: Arch Raises, great toe extension lifts, great toe flexion;    Written Home Exercises Provided: Patient instructed to cont prior HEP. Exercises were reviewed and Zoë was able to demonstrate them prior to the end of the session.  Zoë demonstrated good  understanding of the education provided. See EMR under Patient Instructions for exercises provided during therapy sessions    ASSESSMENT   Zoë is a 63 y.o. female referred to outpatient Physical Therapy with a medical diagnosis of M22.2X1 (ICD-10-CM) - Patellofemoral disorders, right knee M21.41 (ICD-10-CM) - Flat foot (pes planus) (acquired), right foot. Pt presents with complaints of foot and knee pain with gait tasks.     Patient demonstrates good tolerance with today's session. Land based session performed today and initiated with [employer] bike for closed chain strength and endurance. Patient performs with good pacing and without overexertion,  "which she was anticipating. Compression wrap utilized for mobilization of ankle with static stretching and active PF. Great toe extension lifts are much improved. Increased time spent on arch raises to improve performance technique, avoiding toe flexion. Patient able to perform with proper contraction. At session conclusion, patient reports feeling good.   Patient remains with TTP at R>L plantar fascia, DF ROM limitations, moderate calf (L>R) and hamstring (R>L) tightness, and deficits in intrinsics.  Pool session at next visit.    Zoë Is progressing well towards her goals.   Pt prognosis is Good.     Pt will continue to benefit from skilled outpatient physical therapy to address the deficits listed in the problem list box on initial evaluation, provide pt/family education and to maximize pt's level of independence in the home and community environment.     Pt's spiritual, cultural and educational needs considered and pt agreeable to plan of care and goals.     Anticipated barriers to physical therapy: None    Goals:  Short Term Goals: 4 weeks   1. Report decreased pain  < / =  2/10 "At Worst" to increase tolerance for ADLs  2. Demonstrate Ankle dorsiflexion >/= 5 degrees actively and >/= 12 degrees passively.  3. Demonstrate VMO contraction quality of Good to demonstrate joint stability.  4. Increase LE strength to >/= 4/5 MMT grade in BLE to increase tolerance for ADL and work activities.  5. Pt to tolerate HEP to improve ROM and independence with ADL's     Long Term Goals: 8 weeks   1. Increase strength to >/= 4+/5 in BLE to increase tolerance for ADL and work activities.  2. Demonstrate ability to negotiate stairs and perform community ambulation without instability or limitation due to knee/ankle pain.  3. Patient goal: To get enough strength to maintain mobility and improve walking  4. Pt will score </= 50% limitation on FOTO to demonstrate increase in LE function and mobility in home and community " environment.     PLAN     Advance patient per PT POC, as tolerated.  * bilateral knee / foot - Alternate Land-based and aquatic therapy sessions;  Generalized BLE strength, motor control/stability, and standing balance (No eyes closed, No single limb stance);  Ankle ROM (especially DF), foot intrinsics, gastroc/soleus flexibility, eccentric quad control;     Plan of care Certification: 2/28/2023 to 5/28/23.     Outpatient Physical Therapy 2 times weekly for 8 weeks to include the following interventions: Aquatic Therapy, Therapeutic Exercise, Gait Training, Manual Therapy, Moist Heat/ Ice, Neuromuscular Re-ed, Patient Education, Self Care, and Therapeutic Activities.    Diane Mercer, PT

## 2023-03-14 ENCOUNTER — CLINICAL SUPPORT (OUTPATIENT)
Dept: REHABILITATION | Facility: HOSPITAL | Age: 63
End: 2023-03-14
Payer: COMMERCIAL

## 2023-03-14 DIAGNOSIS — M21.41 FLAT FOOT (PES PLANUS) (ACQUIRED), RIGHT FOOT: ICD-10-CM

## 2023-03-14 DIAGNOSIS — M22.2X1 PATELLOFEMORAL DISORDERS, RIGHT KNEE: Primary | ICD-10-CM

## 2023-03-14 PROCEDURE — 97113 AQUATIC THERAPY/EXERCISES: CPT | Mod: PN,CQ

## 2023-03-14 NOTE — PROGRESS NOTES
[facility]   Physical Therapy Treatment Note     Name: Zoë Dupree  Clinic Number: [id number]    Therapy Diagnosis:   Encounter Diagnoses   Name Primary?    Patellofemoral disorders, right knee Yes    Flat foot (pes planus) (acquired), right foot        Physician: Stoney Bowen NP    Visit Date: 3/14/2023    Physician: Stoney Bowen NP     Physician Orders: PT Eval and Treat   Medical Diagnosis from Referral:   M22.2X1 (ICD-10-CM) - Patellofemoral disorders, right knee   M21.41 (ICD-10-CM) - Flat foot (pes planus) (acquired), right foot   Evaluation Date: 2/28/2023  Authorization Period Expiration: 12/31/2023   Plan of Care Expiration: 5/28/23  Visit # / Visits authorized: 3/ 8 (Not including Initial Evaluation)  FOTO Visit #:  1/3     PTA Visit #: 1/5     Time In: 12:35 PM  Time Out: 1:28 PM  Total Billable Time: 53 minutes    Precautions: Standard, Fall, and Vestibular dysfunction - No balance with eyes closed or single limb    SUBJECTIVE     Pt reports: doing good today.    She was compliant with home exercise program.  Response to previous treatment: No adverse response to previous session.  Functional change: None at this time.    Pain: 0/10  Location: Top of the right foot; bilateral knees  Description: sharp, stabbing on foot; dull, ache at knees  Aggravating Factors: up/down stairs (especially descending), walking down slopes, uneven surfaces, and increased walking     Pts goals: To get enough strength to maintain mobility and improve walking    OBJECTIVE     Objective Measures updated at progress report unless specified.     ANKLE ROM (Active / Passive)                          Left                  Right  Plantarflexion  50/55               55  Dorsiflexion --  -2/4                  0/6  Inversion ------ 30/35               30/35  Eversion ------  10/20               10/15    Lower Extremity MMT                          Left      Right  HIP  Flexion --------  4-         4                         Extension ----  4          4-                       Abduction ----  4          4            Adduction ----  5          4+                      Int Rot ---------  3+        4  Ext Rot --------  3+        4-                       KNEE  Extension ----  4          4+  VMO Quality    Fair      Fair  ANKLE  Plantarflexion  3          3  Inversion         4-         4            Eversion          3+        4-     Great Toe Flexion/Extension Right weaker than Left       Treatment     Zoë received the treatments listed below:      Therapeutic Exercises to develop strength, endurance, ROM, flexibility, posture, and core stabilization for 53 minutes including:  DNP  NuStep, Level 3 [id number]  Self-soft tissue mobilization of Plantar Fascia using tennis ball R/L x2mins (R>L tenderness)  Passive DF [id number] R/L  Plantar fascia stretch [id number] R/L  Gastroc stretch on foam wedge with compression wrap, Total of [id number] (2sets with wrap, 2sets without)  Soleus stretch  with compression wrap, [id number] holds  Standing Calf Raises, 2x20 (1set performed R/L with compression wrap)  Seated Bilateral Towel scrunches 1x20  Seated heel raise with tennis ball hold at heels 1x20  Seated Great toe extension lifts 1x20  Seated Great toe flexion with 2-5 remaining in extension, 1x20;  Seated Alternating Toe Taps, Great toe and 5th, 1x15  Arch Raises [id number] of work and education       Aquatic Exercises to develop strength, endurance, ROM, flexibility, posture, and core stabilization for 53 minutes including:  Forward Walking x7mins  Retro Walking x5mins  Lateral Stepping Right/Left x5mins   [place] length of pool (~20feet) x5 rounds R/L  Calf stretch on wall [id number] R/L  Bilateral Heel Raises 1x20  Hip Flexion with kickboard for unstable BUE support, 1x20 R/L  Hip Abduction with kickboard support, 1x20 R/L  Hip Extension with kickboard support, 1x20 R/L  Hip Circles CW/CCW with stable UE support, 15x R/L in each  direction  Hamstring Curls with stable BUE support, 20x R/L  Knee extensions (with hip flexed to 90deg) with stable UE support, 20x R/L  Mini Squat with kickboard support, 1x20  Hamstring stretch [id number] R/L  Quad stretch [id number] R/L    Manual Therapy Techniques: - were applied to the: - for - minutes, including:    Talocrural Mos    Neuromuscular Re-education activities to improve: Balance, Coordination, Kinesthetic, Sense, Proprioception, and Posture for - minutes. The following activities were included:    Therapeutic Activities to improve functional performance for - minutes, including:    Gait Training to improve functional mobility and safety for - minutes, including:    Direct Contact Modalities after being cleared for contraindications:     Supervised Modalities after being cleared for contradictions:     hot pack for - minutes to -.    cold pack for - minutes to -.    Patient Education and Home Exercises     Home Exercises Provided and Patient Education Provided     Education provided:   - HEP Review: Arch Raises, great toe extension lifts, great toe flexion;    Written Home Exercises Provided: Patient instructed to cont prior HEP. Exercises were reviewed and Zoë was able to demonstrate them prior to the end of the session.  Zoë demonstrated good  understanding of the education provided. See EMR under Patient Instructions for exercises provided during therapy sessions    ASSESSMENT   Zoë is a 63 y.o. female referred to outpatient Physical Therapy with a medical diagnosis of M22.2X1 (ICD-10-CM) - Patellofemoral disorders, right knee M21.41 (ICD-10-CM) - Flat foot (pes planus) (acquired), right foot. Pt presents with complaints of foot and knee pain with gait tasks.     Patient demonstrates good tolerance with today's session. Land based session performed today and initiated with [employer] bike for closed chain strength and endurance. Patient performs with good pacing and without overexertion, which she was  "anticipating. Compression wrap utilized for mobilization of ankle with static stretching and active PF. Great toe extension lifts are much improved. Increased time spent on arch raises to improve performance technique, avoiding toe flexion. Patient able to perform with proper contraction. At session conclusion, patient reports feeling good.   Patient remains with TTP at R>L plantar fascia, DF ROM limitations, moderate calf (L>R) and hamstring (R>L) tightness, and deficits in intrinsics.  Pool session at next visit.    Zoë Is progressing well towards her goals.   Pt prognosis is Good.     Pt will continue to benefit from skilled outpatient physical therapy to address the deficits listed in the problem list box on initial evaluation, provide pt/family education and to maximize pt's level of independence in the home and community environment.     Pt's spiritual, cultural and educational needs considered and pt agreeable to plan of care and goals.     Anticipated barriers to physical therapy: None    Goals:  Short Term Goals: 4 weeks   1. Report decreased pain  < / =  2/10 "At Worst" to increase tolerance for ADLs  2. Demonstrate Ankle dorsiflexion >/= 5 degrees actively and >/= 12 degrees passively.  3. Demonstrate VMO contraction quality of Good to demonstrate joint stability.  4. Increase LE strength to >/= 4/5 MMT grade in BLE to increase tolerance for ADL and work activities.  5. Pt to tolerate HEP to improve ROM and independence with ADL's     Long Term Goals: 8 weeks   1. Increase strength to >/= 4+/5 in BLE to increase tolerance for ADL and work activities.  2. Demonstrate ability to negotiate stairs and perform community ambulation without instability or limitation due to knee/ankle pain.  3. Patient goal: To get enough strength to maintain mobility and improve walking  4. Pt will score </= 50% limitation on FOTO to demonstrate increase in LE function and mobility in home and community environment.     PLAN "     Advance patient per PT POC, as tolerated.  * bilateral knee / foot - Alternate Land-based and aquatic therapy sessions;  Generalized BLE strength, motor control/stability, and standing balance (No eyes closed, No single limb stance);  Ankle ROM (especially DF), foot intrinsics, gastroc/soleus flexibility, eccentric quad control;     Plan of care Certification: 2/28/2023 to 5/28/23.     Outpatient Physical Therapy 2 times weekly for 8 weeks to include the following interventions: Aquatic Therapy, Therapeutic Exercise, Gait Training, Manual Therapy, Moist Heat/ Ice, Neuromuscular Re-ed, Patient Education, Self Care, and Therapeutic Activities.    Sukhi Herzog, PTA

## 2023-03-15 ENCOUNTER — CLINICAL SUPPORT (OUTPATIENT)
Dept: REHABILITATION | Facility: HOSPITAL | Age: 63
End: 2023-03-15
Payer: COMMERCIAL

## 2023-03-15 DIAGNOSIS — M21.41 FLAT FOOT (PES PLANUS) (ACQUIRED), RIGHT FOOT: ICD-10-CM

## 2023-03-15 DIAGNOSIS — M22.2X1 PATELLOFEMORAL DISORDERS, RIGHT KNEE: Primary | ICD-10-CM

## 2023-03-15 PROCEDURE — 97140 MANUAL THERAPY 1/> REGIONS: CPT | Mod: PN,CQ

## 2023-03-15 PROCEDURE — 97110 THERAPEUTIC EXERCISES: CPT | Mod: PN,CQ

## 2023-03-15 PROCEDURE — 97112 NEUROMUSCULAR REEDUCATION: CPT | Mod: PN,CQ

## 2023-03-15 NOTE — PROGRESS NOTES
[facility]   Physical Therapy Treatment Note     Name: Zoë Dupree  Clinic Number: [id number]    Therapy Diagnosis:   Encounter Diagnoses   Name Primary?    Patellofemoral disorders, right knee Yes    Flat foot (pes planus) (acquired), right foot          Physician: Stoney Bowen NP    Visit Date: 3/15/2023    Physician: Stoney Bowen NP     Physician Orders: PT Eval and Treat   Medical Diagnosis from Referral:   M22.2X1 (ICD-10-CM) - Patellofemoral disorders, right knee   M21.41 (ICD-10-CM) - Flat foot (pes planus) (acquired), right foot   Evaluation Date: 2/28/2023  Authorization Period Expiration: 12/31/2023   Plan of Care Expiration: 5/28/23  Visit # / Visits authorized: 4/ 8 (Not including Initial Evaluation)  FOTO Visit #:  1/3     PTA Visit #: 2/5     Time In: 10:00 am  Time Out: 10:55 am  Total Billable Time: 53 minutes    Precautions: Standard, Fall, and Vestibular dysfunction - No balance with eyes closed or single limb    SUBJECTIVE     Pt reports: has no new complaints.    She was compliant with home exercise program.  Response to previous treatment: No adverse response to previous session.  Functional change: None at this time.    Pain: 0/10  Location: Top of the right foot; bilateral knees  Description: sharp, stabbing on foot; dull, ache at knees  Aggravating Factors: up/down stairs (especially descending), walking down slopes, uneven surfaces, and increased walking     Pts goals: To get enough strength to maintain mobility and improve walking    OBJECTIVE     Objective Measures updated at progress report unless specified.     ANKLE ROM (Active / Passive)                          Left                  Right  Plantarflexion  50/55               55  Dorsiflexion --  -2/4                  0/6  Inversion ------ 30/35               30/35  Eversion ------  10/20               10/15    Lower Extremity MMT                          Left      Right  HIP  Flexion --------  4-          4                        Extension ----  4          4-                       Abduction ----  4          4            Adduction ----  5          4+                      Int Rot ---------  3+        4  Ext Rot --------  3+        4-                       KNEE  Extension ----  4          4+  VMO Quality    Fair      Fair  ANKLE  Plantarflexion  3          3  Inversion         4-         4            Eversion          3+        4-     Great Toe Flexion/Extension Right weaker than Left       Treatment     Zoë received the treatments listed below:      Therapeutic Exercises to develop strength, endurance, ROM, flexibility, posture, and core stabilization for 25 minutes including:    NuStep, Level 4 x10 minutes    Para bars  Heel raises 2 x 20  Wedge stretch 3 x 30 sec    Neuromuscular Re-education activities to improve: Balance, Coordination, Kinesthetic, Sense, Proprioception, and Posture for 15 minutes. The following activities were included:  Para bars  Decline wedge extension to iso VMO eccentric training x 10  Step down on step and back up x 5  Stance on foam to improve righting reactions 2 x 30 sec EO/EC  Toe taps x 2 min        Reviewed hep: issued red and green t band  DNP today  Self-soft tissue mobilization of Plantar Fascia using tennis ball R/L x2mins (R>L tenderness)  Passive DF [id number] R/L  Plantar fascia stretch [id number] R/L  Gastroc stretch on foam wedge with compression wrap, Total of [id number] (2sets with wrap, 2sets without)  Soleus stretch  with compression wrap, [id number] holds  Standing Calf Raises, 2x20 (1set performed R/L with compression wrap)  Seated Bilateral Towel scrunches 1x20  Seated heel raise with tennis ball hold at heels 1x20  Seated Great toe extension lifts 1x20  Seated Great toe flexion with 2-5 remaining in extension, 1x20;  Seated Alternating Toe Taps, Great toe and 5th, 1x15  Arch Raises [id number] of work and education     DNP today  Aquatic Exercises to develop strength, endurance,  ROM, flexibility, posture, and core stabilization for 0 minutes including:  Forward Walking x7mins  Retro Walking x5mins  Lateral Stepping Right/Left x5mins   [place] length of pool (~20feet) x5 rounds R/L  Calf stretch on wall [id number] R/L  Bilateral Heel Raises 1x20  Hip Flexion with kickboard for unstable BUE support, 1x20 R/L  Hip Abduction with kickboard support, 1x20 R/L  Hip Extension with kickboard support, 1x20 R/L  Hip Circles CW/CCW with stable UE support, 15x R/L in each direction  Hamstring Curls with stable BUE support, 20x R/L  Knee extensions (with hip flexed to 90deg) with stable UE support, 20x R/L  Mini Squat with kickboard support, 1x20  Hamstring stretch [id number] R/L  Quad stretch [id number] R/L    Manual Therapy Techniques: - were applied to the: right/left LE for 15 minutes, including:  IASTM   Planter fascia   Gastroch/soles      Patient Education and Home Exercises     Home Exercises Provided and Patient Education Provided     Education provided:   - HEP Review: Arch Raises, great toe extension lifts, great toe flexion;    Written Home Exercises Provided: Patient instructed to cont prior HEP. Exercises were reviewed and Zoë was able to demonstrate them prior to the end of the session.  Zoë demonstrated good  understanding of the education provided. See EMR under Patient Instructions for exercises provided during therapy sessions    ASSESSMENT   Patient is doing well with her pain levels.  She responded well to light IASTM or planter fascia and gastroch/soles areas.    Zoë is a 63 y.o. female referred to outpatient Physical Therapy with a medical diagnosis of M22.2X1 (ICD-10-CM) - Patellofemoral disorders, right knee M21.41 (ICD-10-CM) - Flat foot (pes planus) (acquired), right foot. Pt presents with complaints of foot and knee pain with gait tasks.     Patient demonstrates good tolerance with today's session. Land based session performed today and initiated with Nustep bike for closed chain  "strength and endurance. Patient performs with good pacing and without overexertion, which she was anticipating. Compression wrap utilized for mobilization of ankle with static stretching and active PF. Great toe extension lifts are much improved. Increased time spent on arch raises to improve performance technique, avoiding toe flexion. Patient able to perform with proper contraction. At session conclusion, patient reports feeling good.   Patient remains with TTP at R>L plantar fascia, DF ROM limitations, moderate calf (L>R) and hamstring (R>L) tightness, and deficits in intrinsics.  Pool session at next visit.    Zoë Is progressing well towards her goals.   Pt prognosis is Good.     Pt will continue to benefit from skilled outpatient physical therapy to address the deficits listed in the problem list box on initial evaluation, provide pt/family education and to maximize pt's level of independence in the home and community environment.     Pt's spiritual, cultural and educational needs considered and pt agreeable to plan of care and goals.     Anticipated barriers to physical therapy: None    Goals:  Short Term Goals: 4 weeks   1. Report decreased pain  < / =  2/10 "At Worst" to increase tolerance for ADLs  2. Demonstrate Ankle dorsiflexion >/= 5 degrees actively and >/= 12 degrees passively.  3. Demonstrate VMO contraction quality of Good to demonstrate joint stability.  4. Increase LE strength to >/= 4/5 MMT grade in BLE to increase tolerance for ADL and work activities.  5. Pt to tolerate HEP to improve ROM and independence with ADL's     Long Term Goals: 8 weeks   1. Increase strength to >/= 4+/5 in BLE to increase tolerance for ADL and work activities.  2. Demonstrate ability to negotiate stairs and perform community ambulation without instability or limitation due to knee/ankle pain.  3. Patient goal: To get enough strength to maintain mobility and improve walking  4. Pt will score </= 50% limitation on FOTO " to demonstrate increase in LE function and mobility in home and community environment.     PLAN     Advance patient per PT POC, as tolerated.  * bilateral knee / foot - Alternate Land-based and aquatic therapy sessions;  Generalized BLE strength, motor control/stability, and standing balance (No eyes closed, No single limb stance);  Ankle ROM (especially DF), foot intrinsics, gastroc/soleus flexibility, eccentric quad control;     Plan of care Certification: 2/28/2023 to 5/28/23.     Outpatient Physical Therapy 2 times weekly for 8 weeks to include the following interventions: Aquatic Therapy, Therapeutic Exercise, Gait Training, Manual Therapy, Moist Heat/ Ice, Neuromuscular Re-ed, Patient Education, Self Care, and Therapeutic Activities.    Sukhi Herzog, PTA

## 2023-03-21 ENCOUNTER — CLINICAL SUPPORT (OUTPATIENT)
Dept: REHABILITATION | Facility: HOSPITAL | Age: 63
End: 2023-03-21
Payer: COMMERCIAL

## 2023-03-21 DIAGNOSIS — M22.2X1 PATELLOFEMORAL DISORDERS, RIGHT KNEE: Primary | ICD-10-CM

## 2023-03-21 DIAGNOSIS — M21.41 FLAT FOOT (PES PLANUS) (ACQUIRED), RIGHT FOOT: ICD-10-CM

## 2023-03-21 PROCEDURE — 97113 AQUATIC THERAPY/EXERCISES: CPT | Mod: PN

## 2023-03-21 NOTE — PROGRESS NOTES
[facility]   Physical Therapy Treatment Note     Name: Zoë Dupree  Clinic Number: [id number]    Therapy Diagnosis:   Encounter Diagnoses   Name Primary?    Patellofemoral disorders, right knee Yes    Flat foot (pes planus) (acquired), right foot      Physician: Stoney Bowen NP    Visit Date: 3/21/2023    Physician: Stoney Bowen NP     Physician Orders: PT Eval and Treat   Medical Diagnosis from Referral:   M22.2X1 (ICD-10-CM) - Patellofemoral disorders, right knee   M21.41 (ICD-10-CM) - Flat foot (pes planus) (acquired), right foot   Evaluation Date: 2/28/2023  Authorization Period Expiration: 12/31/2023   Plan of Care Expiration: 5/28/23  Visit # / Visits authorized: 5 / 8 (Not including Initial Evaluation)  FOTO Visit #:  1/3     PTA Visit #: 2/5     Time In: 11:02 AM  Time Out: 11:57 AM  Total Billable Time: 53 minutes    Precautions: Standard, Fall, and Vestibular dysfunction - No balance with eyes closed or single limb    SUBJECTIVE     Pt reports: Using the tool on the foot was uncomfortable, but it seemed to help.    She was compliant with home exercise program.  Response to previous treatment: No adverse response to previous session.  Functional change: None at this time.    Pain: 0/10  Location: Top of the right foot; bilateral knees  Description: sharp, stabbing on foot; dull, ache at knees  Aggravating Factors: up/down stairs (especially descending), walking down slopes, uneven surfaces, and increased walking     Pts goals: To get enough strength to maintain mobility and improve walking    OBJECTIVE     Objective Measures updated at progress report unless specified.     ANKLE ROM (Active / Passive)                          Left                  Right  Plantarflexion  50/55               55  Dorsiflexion --  -2/4                  0/6  Inversion ------ 30/35               30/35  Eversion ------  10/20               10/15    Lower Extremity MMT                          Left       Right  HIP  Flexion --------  4-         4                        Extension ----  4          4-                       Abduction ----  4          4            Adduction ----  5          4+                      Int Rot ---------  3+        4  Ext Rot --------  3+        4-                       KNEE  Extension ----  4          4+  VMO Quality    Fair      Fair  ANKLE  Plantarflexion  3          3  Inversion         4-         4            Eversion          3+        4-     Great Toe Flexion/Extension Right weaker than Left       Treatment     Zoë received the treatments listed below:      Therapeutic Exercises to develop strength, endurance, ROM, flexibility, posture, and core stabilization for - minutes including:  Not Performed Today:  NuStep, Level 4 x10 minutes  Self-soft tissue mobilization of Plantar Fascia using tennis ball R/L x2mins (R>L tenderness)  Passive DF [id number] R/L  Plantar fascia stretch [id number] R/L  Gastroc stretch on foam wedge with compression wrap, Total of [id number] (2sets with wrap, 2sets without)  Soleus stretch  with compression wrap, 6l26glr holds  Standing Calf Raises, 2x20 (1set performed R/L with compression wrap)  Seated Bilateral Towel scrunches 1x20  Seated heel raise with tennis ball hold at heels 1x20  Seated Great toe extension lifts 1x20  Seated Great toe flexion with 2-5 remaining in extension, 1x20;  Seated Alternating Toe Taps, Great toe and 5th, 1x15  Arch Raises [id number] of work and education   Para bars  Heel raises 2 x 20  Wedge stretch 3 x 30 sec    Neuromuscular Re-education activities to improve: Balance, Coordination, Kinesthetic, Sense, Proprioception, and Posture for - minutes. The following activities were included:  Not Performed Today:  Para bars  Decline wedge extension to iso VMO eccentric training x 10  Step down on step and back up x 5  Stance on foam to improve righting reactions 2 x 30 sec EO/EC  Toe taps x 2 min    Aquatic Exercises to develop strength,  endurance, ROM, flexibility, posture, and core stabilization for 53 minutes including:  Forward Walking [id number]  Retro Walking x8mins  Lateral Stepping Right/Left x5mins   [place] length of pool (~20feet) x5 rounds R/L  Quad stretch [id number] R/L  Bilateral Heel Raises 2x20  Hip Flexion with kickboard for unstable BUE support, 2x15 R/L  Hip Abduction with kickboard support, 2x15 R/L  Hip Extension with kickboard support, 2x15 R/L  Mini Squat with kickboard support, 1x20  Hip Circles CW/CCW with stable UE support, 10x R/L in each direction  Hamstring Curls with stable BUE support, 2x15 R/L  Knee extensions (with hip flexed to 90deg) with stable UE support, 2x15 R/L  Hamstring stretch [id number] R/L  Calf stretch on wall [id number] R/L    Manual Therapy Techniques: - were applied to the: right/left LE for - minutes, including:    Patient Education and Home Exercises     Home Exercises Provided and Patient Education Provided     Education provided:   - HEP Review: Arch Raises, great toe extension lifts, great toe flexion;  Red and Green Therabands have been provided;     Written Home Exercises Provided: Patient instructed to cont prior HEP. Exercises were reviewed and Zoë was able to demonstrate them prior to the end of the session.  Zoë demonstrated good  understanding of the education provided. See EMR under Patient Instructions for exercises provided during therapy sessions    ASSESSMENT   Zoë is a 63 y.o. female referred to outpatient Physical Therapy with a medical diagnosis of M22.2X1 (ICD-10-CM) - Patellofemoral disorders, right knee M21.41 (ICD-10-CM) - Flat foot (pes planus) (acquired), right foot. Pt presents with complaints of foot and knee pain with gait tasks.     Patient demonstrates good tolerance with today's session. Aquatic based session performed today. Several interventions progressed (duration and/or repetition) without adverse response. LE interventions were performed with least amount of UE support  "to allow for stability challenge but maintain performance through full, available ROM. Postural stability is challenged with use of kickboard for UE support versus (stable) railing along pool wall. Much improved performance technique with standing hip extension, as patient does not demonstrate compensatory lumbar extension or report symptom provocation at the lower back.   At session conclusion, patient denies any symptom provocation.   Patient remains with musculoskeletal deficits: TTP at R>L plantar fascia, DF ROM limitations, moderate calf (L>R) and hamstring (R>L) tightness, and deficits in intrinsics.  Plan to continue with IASTM in future, land-based sessions as patient responded well with light application at plantar fascia and gastroc/soleus areas.    Zoë Is progressing well towards her goals.   Pt prognosis is Good.   Pt will continue to benefit from skilled outpatient physical therapy to address the deficits listed in the problem list box on initial evaluation, provide pt/family education and to maximize pt's level of independence in the home and community environment.   Pt's spiritual, cultural and educational needs considered and pt agreeable to plan of care and goals.  Anticipated barriers to physical therapy: None    Goals:  Short Term Goals: 4 weeks   1. Report decreased pain  < / =  2/10 "At Worst" to increase tolerance for ADLs  2. Demonstrate Ankle dorsiflexion >/= 5 degrees actively and >/= 12 degrees passively.  3. Demonstrate VMO contraction quality of Good to demonstrate joint stability.  4. Increase LE strength to >/= 4/5 MMT grade in BLE to increase tolerance for ADL and work activities.  5. Pt to tolerate HEP to improve ROM and independence with ADL's     Long Term Goals: 8 weeks   1. Increase strength to >/= 4+/5 in BLE to increase tolerance for ADL and work activities.  2. Demonstrate ability to negotiate stairs and perform community ambulation without instability or limitation due to " knee/ankle pain.  3. Patient goal: To get enough strength to maintain mobility and improve walking  4. Pt will score </= 50% limitation on FOTO to demonstrate increase in LE function and mobility in home and community environment.     PLAN     Advance patient per PT POC, as tolerated.  * bilateral knee / foot - Alternate Land-based and aquatic therapy sessions;  Generalized BLE strength, motor control/stability, and standing balance (No eyes closed, No single limb stance);  Ankle ROM (especially DF), foot intrinsics, gastroc/soleus flexibility, eccentric quad control;     Plan of care Certification: 2/28/2023 to 5/28/23.  Outpatient Physical Therapy 2 times weekly for 8 weeks to include the following interventions: Aquatic Therapy, Therapeutic Exercise, Gait Training, Manual Therapy, Moist Heat/ Ice, Neuromuscular Re-ed, Patient Education, Self Care, and Therapeutic Activities.    Diane Mercer, PT

## 2023-03-22 NOTE — PROGRESS NOTES
[facility]   Physical Therapy Treatment Note     Name: Zoë Dupree  Clinic Number: [id number]    Therapy Diagnosis:   Encounter Diagnoses   Name Primary?    Patellofemoral disorders, right knee Yes    Flat foot (pes planus) (acquired), right foot      Physician: Stoney Bowen NP    Visit Date: 3/23/2023    Physician: Stoney Bowen NP     Physician Orders: PT Eval and Treat   Medical Diagnosis from Referral:   M22.2X1 (ICD-10-CM) - Patellofemoral disorders, right knee   M21.41 (ICD-10-CM) - Flat foot (pes planus) (acquired), right foot   Evaluation Date: 2/28/2023  Authorization Period Expiration: 12/31/2023   Plan of Care Expiration: 5/28/23  Visit # / Visits authorized: 6 / 8 (Not including Initial Evaluation)  FOTO Visit #:  1/3     PTA Visit #: 2/5     Time In: 11:02 AM  Time Out: 11:58 AM  Total Billable Time: 53 minutes    Precautions: Standard, Fall, and Vestibular dysfunction - No balance with eyes closed or single limb    SUBJECTIVE     Pt reports: I'm doing well - There's no pain in the foot and knee but I am noticing some discomfort in the right low back.     She was compliant with home exercise program.  Response to previous treatment: No adverse response to previous session.  Functional change: None at this time.    Pain: 0/10  Location: Top of the right foot; bilateral knees  Description: sharp, stabbing on foot; dull, ache at knees  Aggravating Factors: up/down stairs (especially descending), walking down slopes, uneven surfaces, and increased walking     Pts goals: To get enough strength to maintain mobility and improve walking    OBJECTIVE     Objective Measures updated at progress report unless specified.     ANKLE ROM (Active / Passive)                          Left                  Right  Plantarflexion  50/55               55  Dorsiflexion --  -2/4                  0/6  Inversion ------ 30/35               30/35  Eversion ------  10/20               10/15    Lower  Extremity MMT                          Left      Right  HIP  Flexion --------  4-         4                        Extension ----  4          4-                       Abduction ----  4          4            Adduction ----  5          4+                      Int Rot ---------  3+        4  Ext Rot --------  3+        4-                       KNEE  Extension ----  4          4+  VMO Quality    Fair      Fair  ANKLE  Plantarflexion  3          3  Inversion         4-         4            Eversion          3+        4-     Great Toe Flexion/Extension Right weaker than Left       Treatment     Zoë received the treatments listed below:      Therapeutic Exercises to develop strength, endurance, ROM, flexibility, posture, and core stabilization for 18 minutes including:  NuStep, Level 4 x15 minutes  Standing gastroc stretch on wedge, [id number]  Standing Heel raises 2 x 20  Self-soft tissue mobilization of Plantar Fascia using tennis ball R/L x2mins (R>L tenderness)  Passive DF [id number] R/L  Plantar fascia stretch [id number] R/L  Gastroc stretch on foam wedge with compression wrap, Total of [id number] (2sets with wrap, 2sets without)  Soleus stretch [id number] R/L  Seated Bilateral Towel scrunches 1x20  Seated heel raise with tennis ball hold at heels 1x20  Seated Great toe extension lifts 1x20  Seated Great toe flexion with 2-5 remaining in extension, 1x20;  Seated Alternating Toe Taps, Great toe and 5th, 1x15  Arch Raises [id number] of work and education     Neuromuscular Re-education activities to improve: Balance, Coordination, Kinesthetic, Sense, Proprioception, and Posture for 10 minutes. The following activities were included:  In Parallel Bars=  Static Standing Balance (Eyes Open only):  Narrow Base of Support, Unstable Surface, 7u06obi  Semi-Tandem Base of Support, Stable Surface, Left/Right Foot Forward x30sec on each (significant improvement)  Tandem Base of Support, Stable Surface,  Left Foot Forward = 8seconds // 24seconds  and 30seconds  Right Foot Forward = 3seconds // 7seconds and 17seconds  Semi-Tandem Base of Support, Unstable Surface,   Left Foot Forward = 30 seconds Wide Base ; 30 seconds Narrow Base  Right Foot Forward = 30 seconds Wide Base ; 30 seconds Narrow Base  Decline wedge extension to iso VMO eccentric training x 10  Step down on step and back up x 5  Toe taps x 2 min    Aquatic Exercises to develop strength, endurance, ROM, flexibility, posture, and core stabilization for - minutes including:  Forward Walking [id number]  Retro Walking x8mins  Lateral Stepping Right/Left x5mins   [place] length of pool (~20feet) x5 rounds R/L  Quad stretch [id number] R/L  Bilateral Heel Raises 2x20  Hip Flexion with kickboard for unstable BUE support, 2x15 R/L  Hip Abduction with kickboard support, 2x15 R/L  Hip Extension with kickboard support, 2x15 R/L  Mini Squat with kickboard support, 1x20  Hip Circles CW/CCW with stable UE support, 10x R/L in each direction  Hamstring Curls with stable BUE support, 2x15 R/L  Knee extensions (with hip flexed to 90deg) with stable UE support, 2x15 R/L  Hamstring stretch [id number] R/L  Calf stretch on wall [id number] R/L    Manual Therapy Techniques: - were applied to the: Right/Left lower leg and foot for 25 minutes  Soft / Deep Tissue Mobilization:  Gastroc/Soleus, Peroneals, Posterior Tib, and Plantar fascia;   IASTM:    Brushing/Sweeping Technique applied at Gastroc/Soleus, achilles, Peroneals, Posterior Tib, and Plantar fascia   Application with tissues shortened as well as on stretch  Trigger Point Release:   Gastroc and Plantar fascia    Patient Education and Home Exercises     Home Exercises Provided and Patient Education Provided     Education provided:   - HEP Review: Arch Raises, great toe extension lifts, great toe flexion;  Red and Green Therabands have been provided;     Written Home Exercises Provided: Patient instructed to cont prior HEP. Exercises were reviewed and Zoë was able to  "demonstrate them prior to the end of the session.  Zoë demonstrated good  understanding of the education provided. See EMR under Patient Instructions for exercises provided during therapy sessions    ASSESSMENT   Zoë is a 63 y.o. female referred to outpatient Physical Therapy with a medical diagnosis of M22.2X1 (ICD-10-CM) - Patellofemoral disorders, right knee M21.41 (ICD-10-CM) - Flat foot (pes planus) (acquired), right foot. Pt presents with complaints of foot and knee pain with gait tasks.     Patient demonstrates good tolerance with today's session. NuStep duration progressed without adverse response. Balance activities performed in parallel bars with patient demonstrating improved stability as compared to measures from initial evaluation. Manual interventions continued as these were reportedly beneficial in pain reduction allowing for greater ADL tolerance. Tenderness to palpation noted bilaterally at the plantar fascia and gastroc muscle belly.   At session conclusion, patient denies any symptom provocation.   Patient remains with musculoskeletal deficits: TTP at R>L plantar fascia, DF ROM limitations, moderate calf (L>R) and hamstring (R>L) tightness, and deficits in intrinsics.      Zoë Is progressing well towards her goals.   Pt prognosis is Good.   Pt will continue to benefit from skilled outpatient physical therapy to address the deficits listed in the problem list box on initial evaluation, provide pt/family education and to maximize pt's level of independence in the home and community environment.   Pt's spiritual, cultural and educational needs considered and pt agreeable to plan of care and goals.  Anticipated barriers to physical therapy: None    Goals:  Short Term Goals: 4 weeks   1. Report decreased pain  < / =  2/10 "At Worst" to increase tolerance for ADLs  2. Demonstrate Ankle dorsiflexion >/= 5 degrees actively and >/= 12 degrees passively.  3. Demonstrate VMO contraction quality of Good to " demonstrate joint stability.  4. Increase LE strength to >/= 4/5 MMT grade in BLE to increase tolerance for ADL and work activities.  5. Pt to tolerate HEP to improve ROM and independence with ADL's     Long Term Goals: 8 weeks   1. Increase strength to >/= 4+/5 in BLE to increase tolerance for ADL and work activities.  2. Demonstrate ability to negotiate stairs and perform community ambulation without instability or limitation due to knee/ankle pain.  3. Patient goal: To get enough strength to maintain mobility and improve walking  4. Pt will score </= 50% limitation on FOTO to demonstrate increase in LE function and mobility in home and community environment.     PLAN     Advance patient per PT POC, as tolerated.  Plan to continue with IASTM in future, land-based sessions as patient responded well with light application at plantar fascia and gastroc/soleus areas.    Plan of care Certification: 2/28/2023 to 5/28/23.  Outpatient Physical Therapy 2 times weekly for 8 weeks to include the following interventions: Aquatic Therapy, Therapeutic Exercise, Gait Training, Manual Therapy, Moist Heat/ Ice, Neuromuscular Re-ed, Patient Education, Self Care, and Therapeutic Activities.    Diane Mercer, PT

## 2023-03-23 ENCOUNTER — CLINICAL SUPPORT (OUTPATIENT)
Dept: REHABILITATION | Facility: HOSPITAL | Age: 63
End: 2023-03-23
Payer: COMMERCIAL

## 2023-03-23 DIAGNOSIS — M21.41 FLAT FOOT (PES PLANUS) (ACQUIRED), RIGHT FOOT: ICD-10-CM

## 2023-03-23 DIAGNOSIS — M22.2X1 PATELLOFEMORAL DISORDERS, RIGHT KNEE: Primary | ICD-10-CM

## 2023-03-23 PROCEDURE — 97110 THERAPEUTIC EXERCISES: CPT | Mod: PN

## 2023-03-23 PROCEDURE — 97140 MANUAL THERAPY 1/> REGIONS: CPT | Mod: PN

## 2023-03-23 PROCEDURE — 97112 NEUROMUSCULAR REEDUCATION: CPT | Mod: PN

## 2023-03-28 ENCOUNTER — CLINICAL SUPPORT (OUTPATIENT)
Dept: REHABILITATION | Facility: HOSPITAL | Age: 63
End: 2023-03-28
Payer: COMMERCIAL

## 2023-03-28 DIAGNOSIS — M22.2X1 PATELLOFEMORAL DISORDERS, RIGHT KNEE: Primary | ICD-10-CM

## 2023-03-28 PROCEDURE — 97140 MANUAL THERAPY 1/> REGIONS: CPT | Mod: PN,CQ

## 2023-03-28 PROCEDURE — 97112 NEUROMUSCULAR REEDUCATION: CPT | Mod: PN,CQ

## 2023-03-28 PROCEDURE — 97110 THERAPEUTIC EXERCISES: CPT | Mod: PN,CQ

## 2023-03-28 NOTE — PROGRESS NOTES
[facility]   Physical Therapy Treatment Note     Name: Zoë Dupree  Clinic Number: [id number]    Therapy Diagnosis:   Encounter Diagnosis   Name Primary?    Patellofemoral disorders, right knee Yes     Physician: Stoney Bowen NP    Visit Date: 3/28/2023    Physician: Stoney Bowen NP     Physician Orders: PT Eval and Treat   Medical Diagnosis from Referral:   M22.2X1 (ICD-10-CM) - Patellofemoral disorders, right knee   M21.41 (ICD-10-CM) - Flat foot (pes planus) (acquired), right foot   Evaluation Date: 2/28/2023  Authorization Period Expiration: 12/31/2023   Plan of Care Expiration: 5/28/23  Visit # / Visits authorized: 7 / 8 (Not including Initial Evaluation)  FOTO Visit #:  2/3     PTA Visit #: 1/5     Time In: 12:30 PM  Time Out: 1:25 PM  Total Billable Time: 53 minutes    Precautions: Standard, Fall, and Vestibular dysfunction - No balance with eyes closed or single limb    SUBJECTIVE     Pt reports: getting better.  She purchased [employer] insoles and is still getting use to them.    She was compliant with home exercise program.  Response to previous treatment: No adverse response to previous session.  Functional change: None at this time.    Pain: 0/10  Location: Top of the right foot; bilateral knees  Description: sharp, stabbing on foot; dull, ache at knees  Aggravating Factors: up/down stairs (especially descending), walking down slopes, uneven surfaces, and increased walking     Pts goals: To get enough strength to maintain mobility and improve walking    OBJECTIVE     Objective Measures updated at progress report unless specified.     ANKLE ROM (Active / Passive)                          Left                  Right  Plantarflexion  50/55               55  Dorsiflexion --  -2/4                  0/6  Inversion ------ 30/35               30/35  Eversion ------  10/20               10/15    Lower Extremity MMT                          Left      Right  HIP  Flexion --------  4-          4                        Extension ----  4          4-                       Abduction ----  4          4            Adduction ----  5          4+                      Int Rot ---------  3+        4  Ext Rot --------  3+        4-                       KNEE  Extension ----  4          4+  VMO Quality    Fair      Fair  ANKLE  Plantarflexion  3          3  Inversion         4-         4            Eversion          3+        4-     Great Toe Flexion/Extension Right weaker than Left       Treatment     Zoë received the treatments listed below:      Therapeutic Exercises to develop strength, endurance, ROM, flexibility, posture, and core stabilization for 18 minutes including:  NuStep, Level 4 x15 minutes  Standing gastroc stretch on wedge, [id number]  Standing Heel raises 2 x 20  Self-soft tissue mobilization of Plantar Fascia using tennis ball R/L x2mins (R>L tenderness)  Passive DF [id number] R/L  Plantar fascia stretch 2a73vze R/L  Gastroc stretch on foam wedge with compression wrap, Total of 0t30qeu (2sets with wrap, 2sets without)  Soleus stretch [id number] R/L  Seated Bilateral Towel scrunches 1x20  Seated heel raise with tennis ball hold at heels 1x20  Seated Great toe extension lifts 1x20  Seated Great toe flexion with 2-5 remaining in extension, 1x20;  Seated Alternating Toe Taps, Great toe and 5th, 1x15  Arch Raises [id number] of work and education     Neuromuscular Re-education activities to improve: Balance, Coordination, Kinesthetic, Sense, Proprioception, and Posture for 10 minutes. The following activities were included:  In Parallel Bars=  Static Standing Balance (Eyes Open only):  Narrow Base of Support, Unstable Surface, 9n11lev  Semi-Tandem Base of Support, Stable Surface, Left/Right Foot Forward x30sec on each (significant improvement)  Tandem Base of Support, Stable Surface,  Left Foot Forward = 8seconds // 24seconds and 30seconds  Right Foot Forward = 3seconds // 7seconds and 17seconds  Semi-Tandem  Base of Support, Unstable Surface,   Left Foot Forward = 30 seconds Wide Base ; 30 seconds Narrow Base  Right Foot Forward = 30 seconds Wide Base ; 30 seconds Narrow Base  Decline wedge extension to iso VMO eccentric training x 10  Step down on step and back up x 5  Toe taps x 2 min    Aquatic Exercises to develop strength, endurance, ROM, flexibility, posture, and core stabilization for - minutes including:  Forward Walking [id number]  Retro Walking x8mins  Lateral Stepping Right/Left x5mins   [place] length of pool (~20feet) x5 rounds R/L  Quad stretch [id number] R/L  Bilateral Heel Raises 2x20  Hip Flexion with kickboard for unstable BUE support, 2x15 R/L  Hip Abduction with kickboard support, 2x15 R/L  Hip Extension with kickboard support, 2x15 R/L  Mini Squat with kickboard support, 1x20  Hip Circles CW/CCW with stable UE support, 10x R/L in each direction  Hamstring Curls with stable BUE support, 2x15 R/L  Knee extensions (with hip flexed to 90deg) with stable UE support, 2x15 R/L  Hamstring stretch 6u62rot R/L  Calf stretch on wall 6j14hql R/L    Manual Therapy Techniques: - were applied to the: Right/Left lower leg and foot for 25 minutes  Soft / Deep Tissue Mobilization:  Gastroc/Soleus, Peroneals, Posterior Tib, and Plantar fascia;   IASTM:    Brushing/Sweeping Technique applied at Gastroc/Soleus, achilles, Peroneals, Posterior Tib, and Plantar fascia   Application with tissues shortened as well as on stretch  Trigger Point Release:   Gastroc and Plantar fascia    Patient Education and Home Exercises     Home Exercises Provided and Patient Education Provided     Education provided:   - HEP Review: Arch Raises, great toe extension lifts, great toe flexion;  Red and Green Therabands have been provided;     Written Home Exercises Provided: Patient instructed to cont prior HEP. Exercises were reviewed and Zoë was able to demonstrate them prior to the end of the session.  Zoë demonstrated good  understanding of  "the education provided. See EMR under Patient Instructions for exercises provided during therapy sessions    ASSESSMENT   Zoë is a 63 y.o. female referred to outpatient Physical Therapy with a medical diagnosis of M22.2X1 (ICD-10-CM) - Patellofemoral disorders, right knee M21.41 (ICD-10-CM) - Flat foot (pes planus) (acquired), right foot. Pt presents with complaints of foot and knee pain with gait tasks.     Patient demonstrates good tolerance with today's session. NuStep duration progressed without adverse response. Balance activities performed in parallel bars with patient demonstrating improved stability as compared to measures from initial evaluation. Manual interventions continued as these were reportedly beneficial in pain reduction allowing for greater ADL tolerance. Tenderness to palpation noted bilaterally at the plantar fascia and gastroc muscle belly.   At session conclusion, patient denies any symptom provocation.   Patient remains with musculoskeletal deficits: TTP at R>L plantar fascia, DF ROM limitations, moderate calf (L>R) and hamstring (R>L) tightness, and deficits in intrinsics.      Zoë Is progressing well towards her goals.   Pt prognosis is Good.   Pt will continue to benefit from skilled outpatient physical therapy to address the deficits listed in the problem list box on initial evaluation, provide pt/family education and to maximize pt's level of independence in the home and community environment.   Pt's spiritual, cultural and educational needs considered and pt agreeable to plan of care and goals.  Anticipated barriers to physical therapy: None    Goals:  Short Term Goals: 4 weeks   1. Report decreased pain  < / =  2/10 "At Worst" to increase tolerance for ADLs  2. Demonstrate Ankle dorsiflexion >/= 5 degrees actively and >/= 12 degrees passively.  3. Demonstrate VMO contraction quality of Good to demonstrate joint stability.  4. Increase LE strength to >/= 4/5 MMT grade in BLE to " increase tolerance for ADL and work activities.  5. Pt to tolerate HEP to improve ROM and independence with ADL's     Long Term Goals: 8 weeks   1. Increase strength to >/= 4+/5 in BLE to increase tolerance for ADL and work activities.  2. Demonstrate ability to negotiate stairs and perform community ambulation without instability or limitation due to knee/ankle pain.  3. Patient goal: To get enough strength to maintain mobility and improve walking  4. Pt will score </= 50% limitation on FOTO to demonstrate increase in LE function and mobility in home and community environment.     PLAN     Advance patient per PT POC, as tolerated.  Plan to continue with IASTM in future, land-based sessions as patient responded well with light application at plantar fascia and gastroc/soleus areas.    Plan of care Certification: 2/28/2023 to 5/28/23.  Outpatient Physical Therapy 2 times weekly for 8 weeks to include the following interventions: Aquatic Therapy, Therapeutic Exercise, Gait Training, Manual Therapy, Moist Heat/ Ice, Neuromuscular Re-ed, Patient Education, Self Care, and Therapeutic Activities.    Sukhi Herzog, PTA

## 2023-03-30 ENCOUNTER — CLINICAL SUPPORT (OUTPATIENT)
Dept: REHABILITATION | Facility: HOSPITAL | Age: 63
End: 2023-03-30
Payer: COMMERCIAL

## 2023-03-30 DIAGNOSIS — M22.2X1 PATELLOFEMORAL DISORDERS, RIGHT KNEE: Primary | ICD-10-CM

## 2023-03-30 DIAGNOSIS — M21.41 FLAT FOOT (PES PLANUS) (ACQUIRED), RIGHT FOOT: ICD-10-CM

## 2023-03-30 PROCEDURE — 97110 THERAPEUTIC EXERCISES: CPT | Mod: PN

## 2023-03-30 NOTE — PROGRESS NOTES
[facility]   Physical Therapy Treatment / DISCHARGE Note     Name: Zoë Dupree  Clinic Number: [id number]    Therapy Diagnosis:   Encounter Diagnoses   Name Primary?    Patellofemoral disorders, right knee Yes    Flat foot (pes planus) (acquired), right foot      Physician: Stoney Bowen NP    Visit Date: 3/30/2023    Physician: Stoney Bowen NP     Physician Orders: PT Eval and Treat   Medical Diagnosis from Referral:   M22.2X1 (ICD-10-CM) - Patellofemoral disorders, right knee   M21.41 (ICD-10-CM) - Flat foot (pes planus) (acquired), right foot   Evaluation Date: 2/28/2023  Discharge Note: 3/30/2023  Authorization Period Expiration: 12/31/2023   Plan of Care Expiration: 5/28/23  Visit # / Visits authorized: 8 / 8 (Not including Initial Evaluation)  FOTO Visit #:  3/3     PTA Visit #: 1/5     Time In: 11:00 AM  Time Out: 11:54 AM  Total Billable Time: 53 minutes    Precautions: Standard, Fall, and Vestibular dysfunction - No balance with eyes closed or single limb    SUBJECTIVE     Pt reports: I can tell this has helped. Insurance coverage runs out after this visit, so I'll keep up with the exercises at home. If the pain comes back then i'll get a new referral to get back in here. The balance and the weird pain in my feet have both improved.    She was compliant with home exercise program.  Response to previous treatment: No adverse response to previous session.  Functional change: None at this time.    Pain: 0/10; Current worst 3/10 (Worst, 1/10), best 0/10 (Best, 0/10)  Location: Top of the right foot; bilateral knees  Description: sharp, stabbing on foot; dull, ache at knees RESOLVED  Aggravating Factors: up/down stairs (especially descending), walking down slopes, uneven surfaces, and increased walking     Pts goals: To get enough strength to maintain mobility and improve walking    OBJECTIVE     Observation: Decreased arch height in standing (Slight navicular drop); Bilateral  calcaneal valgus      KNEE ROM (Active / Passive)    2/28/23                           Left      Right    Flexion --------  130      130  Extension ----  0          0     ANKLE ROM (Active / Passive)    2/28/23   3/30/23                          Left                  Right  Left       Right  Plantarflexion  50/55               55  55  55  Dorsiflexion --  -2/4                  0/6  5/10  2/6  Inversion ------ 30/35               30/35  35/40  35/40  Eversion ------  10/20               10/15  10/20  10/15     Lower Extremity MMT    2/28/23  3/30/23                          Left      Right  Left      Right  HIP  Flexion --------  4-         4    5 5                      Extension ----  4          4-    4+ 4                      Abduction ----  4          4   5 4+            Adduction ----  5          4+  5 4+                         Int Rot ---------  3+        4  5 5   Ext Rot --------  3+        4-   4+ 4+                       KNEE  Flexion --------  5          5  5 5   Extension ----  4          4+  4+ 5   VMO Quality    Fair      Fair  Good  Good   ANKLE  Plantarflexion  3          3  4+ 4+   Dorsiflexion --  5          5  5 5   Inversion         4-         4   5 5            Eversion          3+        4-  4 4+      Great Toe Flexion/Extension Right weaker than Left        Left HS and Quad tightness             Standing Balance:   //  3/20/23  Narrow Base of Support  Static:  Eyes Open = 30seconds              Eyes Closed = 4seconds //  NT due to vestibular limitations      Semi-Tandem Base of Support  Eyes Open  Left Foot Forward = 4sec //  30 seconds   Right Foot Forward = 30sec     Tandem Base of Support  Eyes Open  Left Foot Forward = 8seconds     //  30 seconds   Right Foot Forward = 3seconds  //  17 seconds        Limitation/Restriction for FOTO Ankle Survey  Limitation Score:   2/28/23: 65%      3/23/23: 33%      Treatment     Zoë received the treatments listed below:      Therapeutic Exercises to  develop strength, endurance, ROM, flexibility, posture, and core stabilization for 53 minutes including:  NuStep, Level 4 x15 minutes  Standing gastroc stretch on wedge, [id number]  Standing Heel raises 2x15  Standing Hamstring curls 15x R/L  Self-soft tissue mobilization of Plantar Fascia using tennis ball R/L x2mins (R>L tenderness)  Supine R/L Ankle AROM in all planes of motion;   Supine R/L Ankle PROM in all planes of motion;   Supine R/L Ankle Mid-range isometric holds in all planes of motion;   Passive DF [id number] R/L  Plantar fascia stretch [id number] R/L  Soleus stretch [id number] R/L  Seated Bilateral Towel scrunches 1x20  Seated heel raise with tennis ball hold at heels 1x20  Seated Great toe extension lifts 1x20  Seated Great toe flexion with 2-5 remaining in extension, 1x20;  Seated Alternating Toe Taps, Great toe and 5th, 1x15  Arch Raises [id number] of work and education   Clamshells 2x15 R/L  Bridge 2x15  Mid range isometrics for R/L Hip in all planes of motion;   TKE eccentric lowering - anterior / lateral / posterior - R/L on 4in step    Neuromuscular Re-education activities to improve: Balance, Coordination, Kinesthetic, Sense, Proprioception, and Posture for 0 minutes. The following activities were included:  In Parallel Bars=  Static Standing Balance (Eyes Open only):  Narrow Base of Support, Unstable Surface, [id number]  Semi-Tandem Base of Support, Stable Surface, Left/Right Foot Forward x30sec on each (significant improvement)  Tandem Base of Support, Stable Surface,  Left Foot Forward = 8seconds // 24seconds and 30seconds  Right Foot Forward = 3seconds // 7seconds and 17seconds  Semi-Tandem Base of Support, Unstable Surface,   Left Foot Forward = 30 seconds Wide Base ; 30 seconds Narrow Base  Right Foot Forward = 30 seconds Wide Base ; 30 seconds Narrow Base  Decline wedge extension to iso VMO eccentric training x 10  Step down on step and back up x 5  Toe taps x 2 min    Manual Therapy Techniques: - were  "applied to the: Right/Left lower leg and foot for - minutes  Soft / Deep Tissue Mobilization:  Gastroc/Soleus, Peroneals, Posterior Tib, and Plantar fascia;   IASTM:    Brushing/Sweeping Technique applied at Gastroc/Soleus, achilles, Peroneals, Posterior Tib, and Plantar fascia   Application with tissues shortened as well as on stretch  Trigger Point Release:   Gastroc and Plantar fascia    Patient Education and Home Exercises     Home Exercises Provided and Patient Education Provided     Education provided:   - HEP Review: Arch Raises, great toe extension lifts, great toe flexion;  Red and Green Therabands have been provided;     SEE pt instructions for final HEP provided    Written Home Exercises Provided: Patient instructed to cont prior HEP. Exercises were reviewed and Zoë was able to demonstrate them prior to the end of the session.  Zoë demonstrated good  understanding of the education provided. See EMR under Patient Instructions for exercises provided during therapy sessions    ASSESSMENT   Zoë is a 63 y.o. female referred to outpatient Physical Therapy with a medical diagnosis of M22.2X1 (ICD-10-CM) - Patellofemoral disorders, right knee M21.41 (ICD-10-CM) - Flat foot (pes planus) (acquired), right foot. Pt presents with complaints of foot and knee pain with gait tasks. Subjectively, patient reports overall improvement since initiating physical therapy. FOTO scoring demonstrates improvement in function. Objective measures demonstrate improvement in ROM, flexibility, standing balance and BLE strength.  Patient states readiness and confidence with discharge to [facility] and independent management of condition. PT and patient are in agreement with this plan.    Goals:  Short Term Goals: 4 weeks   1. Report decreased pain  < / =  2/10 "At Worst" to increase tolerance for ADLs   - MET 3/30/23  2. Demonstrate Ankle dorsiflexion >/= 5 degrees actively and >/= 12 degrees passively.   - Not Met 3/30/23  3. Demonstrate " VMO contraction quality of Good to demonstrate joint stability.   - MET 3/30/23  4. Increase LE strength to >/= 4/5 MMT grade in BLE to increase tolerance for ADL and work activities.   - MET 3/30/23  5. Pt to tolerate HEP to improve ROM and independence with ADL's   - MET 3/30/23     Long Term Goals: 8 weeks   1. Increase strength to >/= 4+/5 in BLE to increase tolerance for ADL and work activities.   - Partially Met 3/30/23  2. Demonstrate ability to negotiate stairs and perform community ambulation without instability or limitation due to knee/ankle pain.   - Partially Met 3/30/23  3. Patient goal: To get enough strength to maintain mobility and improve walking   - 75% MET 3/30/23  4. Pt will score </= 50% limitation on FOTO to demonstrate increase in LE function and mobility in home and community environment.   - MET 3/30/23    PLAN     Discharge to independent HEP.    Diane Mercer, PT

## 2023-08-04 ENCOUNTER — PATIENT OUTREACH (OUTPATIENT)
Dept: ADMINISTRATIVE | Facility: HOSPITAL | Age: 63
End: 2023-08-04
Payer: COMMERCIAL

## 2023-08-04 NOTE — PROGRESS NOTES
Population Health Chart Review & Patient Outreach Details:     Reason for Outreach Encounter:     [x]  Non-Compliant Report   []  Payor Report ([employer], PHN, BCBS, MSSP, MCIP, UHC, etc.)   []  Pre-Visit Chart Review     Updates Requested / Reviewed:     [x]  Care Everywhere    []     []  External Sources ([facility], [facility], DIS, etc.)   [x]  Care Team Updated    Patient Outreach Method:    []  Telephone Outreach Completed   [] Successful   [] Left Voicemail   [] Unable to Contact (wrong number, no voicemail)  []  [employer] Portal Outreach Sent  []  Letter Outreach Mailed  []  Fax Sent for External Records  []  External Records Upload    Health Maintenance Topics Addressed and Outreach Outcomes / Actions Taken:        []      Breast Cancer Screening []  Mammo Scheduled      []  External Records Requested     []  Added Reminder to Complete to Upcoming Primary Care Appt Notes     []  Patient Declined     []  Patient Will Call Back to Schedule     []  Patient Will Schedule with External Provider / Order Routed if Applicable             []       Cervical Cancer Screening []  Pap Scheduled      []  External Records Requested     []  Added Reminder to Complete to Upcoming Primary Care Appt Notes     []  Patient Declined     []  Patient Will Call Back to Schedule     []  Patient Will Schedule with External Provider               []          Colorectal Cancer Screening []  Colonoscopy Case Request or Referral Placed     []  External Records Requested     []  Added Reminder to Complete to Upcoming Primary Care Appt Notes     []  Patient Declined     []  Patient Will Call Back to Schedule     []  Patient Will Schedule with External Provider     []  Fit Kit Mailed (add the SmartPhrase under additional notes)     []  Reminded Patient to Complete Home Test             []      Diabetic Eye Exam []  Eye Camera Scheduled or Optometry Referral Placed     []  External Records Requested     []  Added Reminder to Complete to  Upcoming Primary Care Appt Notes     []  Patient Declined     []  Patient Will Call Back to Schedule     []  Patient Will Schedule with External Provider             []      Blood Pressure Control []  Primary Care Follow Up Visit Scheduled     []  Remote Blood Pressure Reading Captured     []  Added Reminder to Complete to Upcoming Primary Care Appt Notes     []  Patient Declined     []  Patient Will Call Back / Patient Will Send Portal Message with Reading     []  Patient Will Call Back to Schedule Provider Visit             []       HbA1c & Other Labs []  Lab Appt Scheduled for Due Labs     []  Primary Care Follow Up Visit Scheduled      []  Reminded Patient to Complete Home Test     []  Added Reminder to Complete to Upcoming Primary Care Appt Notes     []  Patient Declined     []  Patient Will Call Back to Schedule     []  Patient Will Schedule with External Provider / Order Routed if Applicable           [x]    Schedule Primary Care Appt []  Primary Care Appt Scheduled     []  Patient Declined     []  Patient Will Call Back to Schedule     []  Pt Established with External Provider & Updated Care Team             []      Medication Adherence []  Primary Care Appointment Scheduled     []  Added Reminder to Upcoming Primary Care Appt Notes     []  Patient Reminded to  Prescription     []  Patient Declined, Provider Notified if Needed     []  Sent Provider Message to Review and/or Add Exclusion to Problem List             []      Osteoporosis Screening []  DXA Appointment Scheduled     []  External Records Requested     []  Added Reminder to Complete to Upcoming Primary Care Appt Notes     []  Patient Declined     []  Patient Will Call Back to Schedule     []  Patient Will Schedule with External Provider / Order Routed if Applicable     Additional Care Coordinator Notes:    Chart review for pt attributed to Dr STACIA Ferrari, noted information pulling in from Care Everywhere that patient is using a non-[facility]  PCP, updated care team at this time.     Further Action Needed If Patient Returns Outreach:

## 2023-08-29 ENCOUNTER — OFFICE VISIT (OUTPATIENT)
Dept: PODIATRY | Facility: CLINIC | Age: 63
End: 2023-08-29
Payer: COMMERCIAL

## 2023-08-29 ENCOUNTER — HOSPITAL ENCOUNTER (OUTPATIENT)
Dept: RADIOLOGY | Facility: HOSPITAL | Age: 63
Discharge: HOME | End: 2023-08-29
Attending: PODIATRIST
Payer: COMMERCIAL

## 2023-08-29 VITALS
DIASTOLIC BLOOD PRESSURE: 84 MMHG | WEIGHT: 157.63 LBS | BODY MASS INDEX: 26.26 KG/M2 | HEIGHT: 65 IN | SYSTOLIC BLOOD PRESSURE: 121 MMHG | HEART RATE: 80 BPM

## 2023-08-29 DIAGNOSIS — M79.672 BILATERAL FOOT PAIN: ICD-10-CM

## 2023-08-29 DIAGNOSIS — R60.0 PEDAL EDEMA: ICD-10-CM

## 2023-08-29 DIAGNOSIS — G58.8 INTERDIGITAL NEUROMA: ICD-10-CM

## 2023-08-29 DIAGNOSIS — M79.671 BILATERAL FOOT PAIN: ICD-10-CM

## 2023-08-29 DIAGNOSIS — G57.91 NEURITIS OF RIGHT FOOT: Primary | ICD-10-CM

## 2023-08-29 DIAGNOSIS — G57.92 NEURITIS OF LEFT FOOT: ICD-10-CM

## 2023-08-29 PROCEDURE — 3008F PR BODY MASS INDEX (BMI) DOCUMENTED: ICD-10-PCS | Mod: CPTII,S$GLB,, | Performed by: PODIATRIST

## 2023-08-29 PROCEDURE — 1159F MED LIST DOCD IN RCRD: CPT | Mod: CPTII,S$GLB,, | Performed by: PODIATRIST

## 2023-08-29 PROCEDURE — 3008F BODY MASS INDEX DOCD: CPT | Mod: CPTII,S$GLB,, | Performed by: PODIATRIST

## 2023-08-29 PROCEDURE — 99999 PR PBB SHADOW E&M-EST. PATIENT-LVL IV: CPT | Mod: PBBFAC,,, | Performed by: PODIATRIST

## 2023-08-29 PROCEDURE — 1160F RVW MEDS BY RX/DR IN RCRD: CPT | Mod: CPTII,S$GLB,, | Performed by: PODIATRIST

## 2023-08-29 PROCEDURE — 99204 OFFICE O/P NEW MOD 45 MIN: CPT | Mod: 25,S$GLB,, | Performed by: PODIATRIST

## 2023-08-29 PROCEDURE — 3079F PR MOST RECENT DIASTOLIC BLOOD PRESSURE 80-89 MM HG: ICD-10-PCS | Mod: CPTII,S$GLB,, | Performed by: PODIATRIST

## 2023-08-29 PROCEDURE — 73630 X-RAY EXAM OF FOOT: CPT | Mod: TC,50

## 2023-08-29 PROCEDURE — 4010F ACE/ARB THERAPY RXD/TAKEN: CPT | Mod: CPTII,S$GLB,, | Performed by: PODIATRIST

## 2023-08-29 PROCEDURE — 96372 PR INJECTION,THERAP/PROPH/DIAG2ST, IM OR SUBCUT: ICD-10-PCS | Mod: S$GLB,,, | Performed by: PODIATRIST

## 2023-08-29 PROCEDURE — 1159F PR MEDICATION LIST DOCUMENTED IN MEDICAL RECORD: ICD-10-PCS | Mod: CPTII,S$GLB,, | Performed by: PODIATRIST

## 2023-08-29 PROCEDURE — 99204 PR OFFICE/OUTPT VISIT, NEW, LEVL IV, 45-59 MIN: ICD-10-PCS | Mod: 25,S$GLB,, | Performed by: PODIATRIST

## 2023-08-29 PROCEDURE — 3079F DIAST BP 80-89 MM HG: CPT | Mod: CPTII,S$GLB,, | Performed by: PODIATRIST

## 2023-08-29 PROCEDURE — 73630 XR FOOT COMPLETE 3 VIEW BILATERAL: ICD-10-PCS | Mod: 26,50,, | Performed by: RADIOLOGY

## 2023-08-29 PROCEDURE — 96372 THER/PROPH/DIAG INJ SC/IM: CPT | Mod: S$GLB,,, | Performed by: PODIATRIST

## 2023-08-29 PROCEDURE — 4010F PR ACE/ARB THEARPY RXD/TAKEN: ICD-10-PCS | Mod: CPTII,S$GLB,, | Performed by: PODIATRIST

## 2023-08-29 PROCEDURE — 1160F PR REVIEW ALL MEDS BY PRESCRIBER/CLIN PHARMACIST DOCUMENTED: ICD-10-PCS | Mod: CPTII,S$GLB,, | Performed by: PODIATRIST

## 2023-08-29 PROCEDURE — 3074F PR MOST RECENT SYSTOLIC BLOOD PRESSURE < 130 MM HG: ICD-10-PCS | Mod: CPTII,S$GLB,, | Performed by: PODIATRIST

## 2023-08-29 PROCEDURE — 73630 X-RAY EXAM OF FOOT: CPT | Mod: 26,50,, | Performed by: RADIOLOGY

## 2023-08-29 PROCEDURE — 3074F SYST BP LT 130 MM HG: CPT | Mod: CPTII,S$GLB,, | Performed by: PODIATRIST

## 2023-08-29 PROCEDURE — 99999 PR PBB SHADOW E&M-EST. PATIENT-LVL IV: ICD-10-PCS | Mod: PBBFAC,,, | Performed by: PODIATRIST

## 2023-08-29 RX ORDER — SEMAGLUTIDE 0.68 MG/ML
0.25 INJECTION, SOLUTION SUBCUTANEOUS
COMMUNITY
Start: 2023-06-20

## 2023-08-29 RX ORDER — BEMPEDOIC ACID AND EZETIMIBE 180; 10 MG/1; MG/1
TABLET, FILM COATED ORAL
COMMUNITY
Start: 2022-08-10

## 2023-08-29 RX ORDER — FAMOTIDINE 40 MG/1
40 TABLET, FILM COATED ORAL
COMMUNITY
Start: 2023-08-21

## 2023-08-29 RX ORDER — BETAMETHASONE DIPROPIONATE 0.5 MG/G
CREAM TOPICAL
COMMUNITY
Start: 2023-02-14

## 2023-08-29 RX ORDER — DICLOFENAC SODIUM 75 MG/1
75 TABLET, DELAYED RELEASE ORAL 2 TIMES DAILY
Qty: 60 TABLET | Refills: 0 | Status: SHIPPED | OUTPATIENT
Start: 2023-08-29 | End: 2023-09-28

## 2023-08-29 RX ORDER — PANTOPRAZOLE SODIUM 20 MG/1
TABLET, DELAYED RELEASE ORAL
COMMUNITY
Start: 2023-06-20 | End: 2023-08-29 | Stop reason: SDUPTHER

## 2023-08-29 RX ORDER — PANTOPRAZOLE SODIUM 20 MG/1
20 TABLET, DELAYED RELEASE ORAL
COMMUNITY
Start: 2023-05-03

## 2023-08-29 RX ORDER — OLOPATADINE HYDROCHLORIDE 1 MG/ML
2 SOLUTION/ DROPS OPHTHALMIC
COMMUNITY
Start: 2022-08-30

## 2023-08-29 RX ORDER — FAMOTIDINE 40 MG/1
TABLET, FILM COATED ORAL
COMMUNITY
Start: 2023-06-20 | End: 2023-08-29 | Stop reason: SDUPTHER

## 2023-08-29 RX ORDER — BETAMETHASONE SODIUM PHOSPHATE AND BETAMETHASONE ACETATE 3; 3 MG/ML; MG/ML
18 INJECTION, SUSPENSION INTRA-ARTICULAR; INTRALESIONAL; INTRAMUSCULAR; SOFT TISSUE
Status: COMPLETED | OUTPATIENT
Start: 2023-08-29 | End: 2023-08-29

## 2023-08-29 RX ADMIN — BETAMETHASONE SODIUM PHOSPHATE AND BETAMETHASONE ACETATE 18 MG: 3; 3 INJECTION, SUSPENSION INTRA-ARTICULAR; INTRALESIONAL; INTRAMUSCULAR; SOFT TISSUE at 03:08

## 2023-09-03 NOTE — PROGRESS NOTES
Subjective:       Patient ID: Zoë Dupree is a 63 y.o. female.    Chief Complaint: Foot Pain  Patient presents with complaint of bilateral foot pain right greater than left.  Patient relates this started when she moved into an upstairs apartment about 11 months ago.  She started to have pain in her knees in her feet.  Son orthopedist who diagnosed her with flat feet and she went to physical therapy.  Went to a 2nd orthopedist who shoulder shoes doing the wrong exercises, until her she had fractures in both feet and was wearing to [employer] walking boots for about 6 weeks which she states did decrease pain and.  She last saw podiatrist in [facility] 4 weeks ago (23) who did MRI's and discussed with her stress fractures, tendinitis.  She is wearing most comfortable tennis shoes and using ice a few times a day.  Still having pain level 4-5/10.    Past Medical History:   Diagnosis Date    GERD (gastroesophageal reflux disease)     Hypertension     Hypothyroidism     Osteoporosis 2020    Restless leg syndrome     Tietze syndrome     Vitamin B 12 deficiency     Vitamin D deficiency      Past Surgical History:   Procedure Laterality Date    breast implants      COSMETIC SURGERY      THYROIDECTOMY, PARTIAL      TOTAL ABDOMINAL HYSTERECTOMY W/ BILATERAL SALPINGOOPHORECTOMY       Family History   Problem Relation Age of Onset    Stroke Father      Social History     Socioeconomic History    Marital status:    Tobacco Use    Smoking status: Former     Current packs/day: 0.00     Types: Cigarettes     Quit date: 12/15/2010     Years since quittin.7    Smokeless tobacco: Never   Substance and Sexual Activity    Alcohol use: Yes     Alcohol/week: 14.0 standard drinks of alcohol     Types: 14 Glasses of wine per week    Drug use: Never    Sexual activity: Not Currently       Current Outpatient Medications   Medication Sig Dispense Refill    famotidine (PEPCID) 40 MG tablet 40 mg.      losartan (COZAAR) 100 MG  "tablet TAKE ONE TABLET BY MOUTH EVERY MORNING  30 tablet 2    pantoprazole (PROTONIX) 20 MG tablet 20 mg.      bempedoic acid-ezetimibe (NEXLIZET) 180-10 mg Tab   1 tab, Oral, Daily, # 70 tab, 0 Refill(s), samples given to patient (Rx)      betamethasone dipropionate 0.05 % cream   45 gm, 0 Refill(s)      diclofenac (VOLTAREN) 75 MG EC tablet Take 1 tablet (75 mg total) by mouth 2 (two) times daily. 60 tablet 0    olopatadine (PATANOL) 0.1 % ophthalmic solution 2 drops.      semaglutide (OZEMPIC) 0.25 mg or 0.5 mg (2 mg/3 mL) pen injector 0.25 mg.       No current facility-administered medications for this visit.     Review of patient's allergies indicates:   Allergen Reactions    Reglan [metoclopramide hcl] Palpitations       Review of Systems   Musculoskeletal:  Negative for gait problem.   All other systems reviewed and are negative.      Objective:      Vitals:    08/29/23 1412   BP: 121/84   Pulse: 80   Weight: 71.5 kg (157 lb 10.1 oz)   Height: 5' 5" (1.651 m)     Physical Exam  Vitals and nursing note reviewed.   Constitutional:       General: She is not in acute distress.     Appearance: Normal appearance.   Cardiovascular:      Pulses:           Dorsalis pedis pulses are 2+ on the right side.        Posterior tibial pulses are 2+ on the right side and 2+ on the left side.   Musculoskeletal:         General: Swelling present.      Right foot: No deformity.      Left foot: No deformity.      Comments: Mild pedal edema and forefoot tenderness bilateral   Feet:      Right foot:      Skin integrity: Skin integrity normal.      Left foot:      Skin integrity: Skin integrity normal.   Skin:     Capillary Refill: Capillary refill takes 2 to 3 seconds.   Neurological:      General: No focal deficit present.      Mental Status: She is alert.      Comments: Evidence of interdigital neuroma 3rd common plantar digital nerve bilateral with neuritis pain right greater than left foot   Psychiatric:         Behavior: " Behavior normal.         Thought Content: Thought content normal.               EXAMINATION:  XR FOOT COMPLETE 3 VIEW BILATERAL     CLINICAL HISTORY:  Pain in right foot     TECHNIQUE:  AP, lateral, and oblique views of both feet were performed.     COMPARISON:  None     FINDINGS:  No acute fracture or dislocation.  No significant soft tissue swelling.     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  Bilateral tarsal bones intact with normal bilateral tarsometatarsal alignment.     There is bone demineralization.     Impression:     1. No acute radiographic findings of the bilateral feet.  2. Bone demineralization.        Electronically signed by: Arturo Taylor  Date:                                            08/29/2023       Assessment:       1. Neuritis of right foot    2. Pedal edema    3. Bilateral foot pain    4. Interdigital neuroma    5. Neuritis of left foot        Plan:         XRAY COMPLETE BILATERAL FEET  18 MG BETAMETHASONE IM LEFT HIP  VOLTAREN GEL 75MG BID WM      Reviewed x-rays with patient reassured no evidence of stress fracture currently, no evidence of a recent or healing stress fracture  We did however discuss inflammation affecting the nerves across the front of both feet right greater than left.    Discussed neuroma/neuritis and anatomy in this location which is consistent with the amount of stairs she was navigating with her move, moving into a new house, repetitive pressure in the ball of the foot  We would a lengthy discussion regarding appropriate shoes, thick sole for shock absorption to help cushion this area and recommended Hoka/Ray  Additionally we discussed arch supports to further placed pressure in the arch and take pressure off the ball of the foot.  Discussed power step Pro Tech, how to remove existing insoles, how to gradually adjust to wearing comfortably  She is to continue tennis shoes at all times even indoors, place on her feet at  bedside in the morning, absolutely no flat shoes or walking sock or barefoot  Reviewed multiple treatments for inflammation including ice and Voltaren gel, frequency needs to be at least 3 times daily  Prescribed diclofenac 75 mg b.i.d. with meals, discussed medication, discontinue if stomach upset.  Do not take with any other prescription or over-the-counter NSAID  Today we discussed IM cortisone injection due to length of time condition has been present, amount of pain and inflammation in her feet.  Reviewed effectiveness in decreasing inflammation, potential side effects and length of time injections may be beneficial.  Advised patient this works best with all other conservative treatments together until pain has resolved  Long term we had a lengthy discussion regarding appropriate shoes   Patient was in understanding and agreement with treatment plan  I counseled the patient on their conditions, implications and medical management.  Instructed patient to contact the office with any changes, questions, concerns, worsening of symptoms.   Total face to face time 40 minutes, exam, assessment, treatment, discussion, additional time for review of chart prior to and following appointment and visit documentation, consultation and coordination of care.    Follow up 2 weeks      This note was created using M*Modal voice recognition software that occasionally misinterpreted phrases or words.

## 2023-09-12 ENCOUNTER — OFFICE VISIT (OUTPATIENT)
Dept: PODIATRY | Facility: CLINIC | Age: 63
End: 2023-09-12
Payer: COMMERCIAL

## 2023-09-12 VITALS
BODY MASS INDEX: 26.16 KG/M2 | HEIGHT: 65 IN | DIASTOLIC BLOOD PRESSURE: 82 MMHG | WEIGHT: 157 LBS | SYSTOLIC BLOOD PRESSURE: 129 MMHG | HEART RATE: 70 BPM

## 2023-09-12 DIAGNOSIS — G57.91 NEURITIS OF RIGHT FOOT: Primary | ICD-10-CM

## 2023-09-12 DIAGNOSIS — M79.672 BILATERAL FOOT PAIN: ICD-10-CM

## 2023-09-12 DIAGNOSIS — M79.671 BILATERAL FOOT PAIN: ICD-10-CM

## 2023-09-12 DIAGNOSIS — G58.8 INTERDIGITAL NEUROMA: ICD-10-CM

## 2023-09-12 DIAGNOSIS — G57.92 NEURITIS OF LEFT FOOT: ICD-10-CM

## 2023-09-12 PROCEDURE — 1159F PR MEDICATION LIST DOCUMENTED IN MEDICAL RECORD: ICD-10-PCS | Mod: CPTII,S$GLB,, | Performed by: PODIATRIST

## 2023-09-12 PROCEDURE — 3074F PR MOST RECENT SYSTOLIC BLOOD PRESSURE < 130 MM HG: ICD-10-PCS | Mod: CPTII,S$GLB,, | Performed by: PODIATRIST

## 2023-09-12 PROCEDURE — 1160F RVW MEDS BY RX/DR IN RCRD: CPT | Mod: CPTII,S$GLB,, | Performed by: PODIATRIST

## 2023-09-12 PROCEDURE — 99999 PR PBB SHADOW E&M-EST. PATIENT-LVL III: ICD-10-PCS | Mod: PBBFAC,,, | Performed by: PODIATRIST

## 2023-09-12 PROCEDURE — 3079F PR MOST RECENT DIASTOLIC BLOOD PRESSURE 80-89 MM HG: ICD-10-PCS | Mod: CPTII,S$GLB,, | Performed by: PODIATRIST

## 2023-09-12 PROCEDURE — 4010F PR ACE/ARB THEARPY RXD/TAKEN: ICD-10-PCS | Mod: CPTII,S$GLB,, | Performed by: PODIATRIST

## 2023-09-12 PROCEDURE — 99214 OFFICE O/P EST MOD 30 MIN: CPT | Mod: S$GLB,,, | Performed by: PODIATRIST

## 2023-09-12 PROCEDURE — 4010F ACE/ARB THERAPY RXD/TAKEN: CPT | Mod: CPTII,S$GLB,, | Performed by: PODIATRIST

## 2023-09-12 PROCEDURE — 1160F PR REVIEW ALL MEDS BY PRESCRIBER/CLIN PHARMACIST DOCUMENTED: ICD-10-PCS | Mod: CPTII,S$GLB,, | Performed by: PODIATRIST

## 2023-09-12 PROCEDURE — 3008F BODY MASS INDEX DOCD: CPT | Mod: CPTII,S$GLB,, | Performed by: PODIATRIST

## 2023-09-12 PROCEDURE — 1159F MED LIST DOCD IN RCRD: CPT | Mod: CPTII,S$GLB,, | Performed by: PODIATRIST

## 2023-09-12 PROCEDURE — 99999 PR PBB SHADOW E&M-EST. PATIENT-LVL III: CPT | Mod: PBBFAC,,, | Performed by: PODIATRIST

## 2023-09-12 PROCEDURE — 3079F DIAST BP 80-89 MM HG: CPT | Mod: CPTII,S$GLB,, | Performed by: PODIATRIST

## 2023-09-12 PROCEDURE — 3074F SYST BP LT 130 MM HG: CPT | Mod: CPTII,S$GLB,, | Performed by: PODIATRIST

## 2023-09-12 PROCEDURE — 3008F PR BODY MASS INDEX (BMI) DOCUMENTED: ICD-10-PCS | Mod: CPTII,S$GLB,, | Performed by: PODIATRIST

## 2023-09-12 PROCEDURE — 99214 PR OFFICE/OUTPT VISIT, EST, LEVL IV, 30-39 MIN: ICD-10-PCS | Mod: S$GLB,,, | Performed by: PODIATRIST

## 2023-09-16 NOTE — PROGRESS NOTES
Subjective:       Patient ID: Zoë Dupree is a 63 y.o. female.    Chief Complaint: Foot Pain  Patient presents  for follow-up  bilateral interdigital neuroma / neuritis.  Patient relates  she was pain-free for 3 days following IM steroid injection and admits she overdid it.  Relates there were all lot of things she had been putting off in needed to do.  She was not able to do these previously due to her foot pain.  She states still overall significant improvement, pain in the front of her feet has decreased considerably.  From time to time she still is experiencing pain mainly in the front of the right ankle.  She states it feels like a band / tightness in this location.  She is resting her feet, elevating and applying ice which she finds very effective.  Is taking diclofenac, well tolerated. Is wearing tennis shoes at all times indoors and out.  Reports no pain at time of visit today.  Had records/MRIs results faxed to our office from  previous podiatrist.      Past Medical History:   Diagnosis Date    GERD (gastroesophageal reflux disease)     Hypertension     Hypothyroidism     Osteoporosis 2020    Restless leg syndrome     Tietze syndrome     Vitamin B 12 deficiency     Vitamin D deficiency      Past Surgical History:   Procedure Laterality Date    breast implants      COSMETIC SURGERY      THYROIDECTOMY, PARTIAL      TOTAL ABDOMINAL HYSTERECTOMY W/ BILATERAL SALPINGOOPHORECTOMY       Family History   Problem Relation Age of Onset    Stroke Father      Social History     Socioeconomic History    Marital status:    Tobacco Use    Smoking status: Former     Current packs/day: 0.00     Types: Cigarettes     Quit date: 12/15/2010     Years since quittin.7    Smokeless tobacco: Never   Substance and Sexual Activity    Alcohol use: Yes     Alcohol/week: 14.0 standard drinks of alcohol     Types: 14 Glasses of wine per week    Drug use: Never    Sexual activity: Not Currently       Current Outpatient  "Medications   Medication Sig Dispense Refill    bempedoic acid-ezetimibe (NEXLIZET) 180-10 mg Tab   1 tab, Oral, Daily, # 70 tab, 0 Refill(s), samples given to patient (Rx)      betamethasone dipropionate 0.05 % cream   45 gm, 0 Refill(s)      diclofenac (VOLTAREN) 75 MG EC tablet Take 1 tablet (75 mg total) by mouth 2 (two) times daily. 60 tablet 0    famotidine (PEPCID) 40 MG tablet 40 mg.      losartan (COZAAR) 100 MG tablet TAKE ONE TABLET BY MOUTH EVERY MORNING  30 tablet 2    olopatadine (PATANOL) 0.1 % ophthalmic solution 2 drops.      pantoprazole (PROTONIX) 20 MG tablet 20 mg.      semaglutide (OZEMPIC) 0.25 mg or 0.5 mg (2 mg/3 mL) pen injector 0.25 mg.       No current facility-administered medications for this visit.     Review of patient's allergies indicates:   Allergen Reactions    Reglan [metoclopramide hcl] Palpitations       Review of Systems   Musculoskeletal:  Negative for gait problem.   All other systems reviewed and are negative.      Objective:      Vitals:    09/12/23 1320   BP: 129/82   Pulse: 70   Weight: 71.2 kg (157 lb)   Height: 5' 5" (1.651 m)     Physical Exam  Vitals and nursing note reviewed.   Constitutional:       General: She is not in acute distress.     Appearance: Normal appearance.   Cardiovascular:      Pulses:           Dorsalis pedis pulses are 2+ on the right side.        Posterior tibial pulses are 2+ on the right side and 2+ on the left side.   Musculoskeletal:         General: Tenderness present.      Right foot: No deformity.      Left foot: No deformity.      Comments: Minimal forefoot tenderness bilateral   Feet:      Right foot:      Skin integrity: Skin integrity normal.      Left foot:      Skin integrity: Skin integrity normal.   Skin:     Capillary Refill: Capillary refill takes 2 to 3 seconds.   Neurological:      General: No focal deficit present.      Mental Status: She is alert.      Comments: Much improved with mild discomfort upon compression interdigital " neuroma 3rd common plantar digital nerve bilateral with neuritis pain anterior right ankle   Psychiatric:         Behavior: Behavior normal.         Thought Content: Thought content normal.         EXAMINATION:  XR FOOT COMPLETE 3 VIEW BILATERAL  CLINICAL HISTORY:  Pain in right foot  COMPARISON:  None  FINDINGS:  No acute fracture or dislocation.  No significant soft tissue swelling.     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  Bilateral tarsal bones intact with normal bilateral tarsometatarsal alignment.     There is bone demineralization.     Impression:  1. No acute radiographic findings of the bilateral feet.  2. Bone demineralization.     Electronically signed by: Arturo Taylor     08/29/2023                   Assessment:       1. Neuritis of right foot    2. Interdigital neuroma    3. Bilateral foot pain    4. Neuritis of left foot        Plan:             Reviewed results  MRI reports with patient performed with her previous podiatrist in     advised patient no stress fracture was noted on the MRI, which is a good sign, stress injury was noted.  Advised there was no evidence of stress fracture on the x-rays we took it last visit, typically there is inflammation, bony callus or regularity along the metatarsal bones, this was absent, again a good sign that she did not have a definitive stress fracture but did have inflammation.  Advised patient I do feel this inflammation was nerve related from the beginning.  Walking boots were effective to  rest these areas  Advised patient significant improvement with IM steroid injection is an excellent sign this is inflammatory related and she needs to continue to treat it topically daily until it is completely resolved  Recommend she continue taking diclofenac 75 mg twice daily for an additional  1 week, then finish remaining prescription 1 daily  Continue ice and topical  therapy 2-3 times daily until pain resolves,  then I  would recommend she utilize  topical treatment each night before bed to make sure pain does not recur  Instructed patient to utilize over-the-counter Aspercreme 4% lidocaine patch as directed on the front of the right ankle  either throughout the day or overnight  She needs to continue wearing appropriate shoes, discussed proper tennis shoes, thick sole for shock absorption  We reviewed appropriate shoes indoors, she is to completely discontinue walking barefoot long term  Patient this condition can easily recur if she is wearing inappropriate shoes especially if she is wearing inappropriate shoes with an increase in activity such as exercise or prolonged walking or standing  Patient was in understanding and agreement with treatment plan  I counseled the patient on their conditions, implications and medical management.  Instructed patient to contact the office with any changes, questions, concerns, worsening of symptoms.   Total face to face time 30 minutes, exam, assessment, treatment, discussion, additional time for review of chart prior to and following appointment and visit documentation, consultation and coordination of care.    Follow up  as needed or if remaining discomfort has not resolved in 2-4 weeks    This note was created using [employer] voice recognition software that occasionally misinterpreted phrases or words.

## 2023-11-16 LAB — CRC RECOMMENDATION EXT: NORMAL

## 2025-02-13 ENCOUNTER — OFFICE VISIT (OUTPATIENT)
Dept: FAMILY MEDICINE | Facility: CLINIC | Age: 65
End: 2025-02-13
Payer: MEDICARE

## 2025-02-13 ENCOUNTER — LAB VISIT (OUTPATIENT)
Dept: LAB | Facility: HOSPITAL | Age: 65
End: 2025-02-13
Attending: FAMILY MEDICINE
Payer: MEDICARE

## 2025-02-13 VITALS
DIASTOLIC BLOOD PRESSURE: 72 MMHG | BODY MASS INDEX: 27.77 KG/M2 | WEIGHT: 166.69 LBS | HEIGHT: 65 IN | OXYGEN SATURATION: 96 % | HEART RATE: 79 BPM | SYSTOLIC BLOOD PRESSURE: 134 MMHG

## 2025-02-13 DIAGNOSIS — I10 ESSENTIAL HYPERTENSION: Primary | ICD-10-CM

## 2025-02-13 DIAGNOSIS — I10 ESSENTIAL HYPERTENSION: Chronic | ICD-10-CM

## 2025-02-13 DIAGNOSIS — I10 ESSENTIAL HYPERTENSION: ICD-10-CM

## 2025-02-13 DIAGNOSIS — E83.42 HYPOMAGNESEMIA: ICD-10-CM

## 2025-02-13 DIAGNOSIS — E87.6 LOW SERUM POTASSIUM: ICD-10-CM

## 2025-02-13 LAB
ALBUMIN SERPL BCP-MCNC: 3.6 G/DL (ref 3.5–5.2)
ALP SERPL-CCNC: 98 U/L (ref 40–150)
ALT SERPL W/O P-5'-P-CCNC: 17 U/L (ref 10–44)
ANION GAP SERPL CALC-SCNC: 11 MMOL/L (ref 8–16)
AST SERPL-CCNC: 14 U/L (ref 10–40)
BILIRUB SERPL-MCNC: 0.3 MG/DL (ref 0.1–1)
BUN SERPL-MCNC: 15 MG/DL (ref 8–23)
CALCIUM SERPL-MCNC: 9.2 MG/DL (ref 8.7–10.5)
CHLORIDE SERPL-SCNC: 105 MMOL/L (ref 95–110)
CO2 SERPL-SCNC: 25 MMOL/L (ref 23–29)
CREAT SERPL-MCNC: 0.7 MG/DL (ref 0.5–1.4)
EST. GFR  (NO RACE VARIABLE): >60 ML/MIN/1.73 M^2
GLUCOSE SERPL-MCNC: 111 MG/DL (ref 70–110)
MAGNESIUM SERPL-MCNC: 1.9 MG/DL (ref 1.6–2.6)
POTASSIUM SERPL-SCNC: 4.3 MMOL/L (ref 3.5–5.1)
PROT SERPL-MCNC: 7 G/DL (ref 6–8.4)
SODIUM SERPL-SCNC: 141 MMOL/L (ref 136–145)

## 2025-02-13 PROCEDURE — 99204 OFFICE O/P NEW MOD 45 MIN: CPT | Mod: S$GLB,,, | Performed by: FAMILY MEDICINE

## 2025-02-13 PROCEDURE — 1159F MED LIST DOCD IN RCRD: CPT | Mod: CPTII,S$GLB,, | Performed by: FAMILY MEDICINE

## 2025-02-13 PROCEDURE — 3008F BODY MASS INDEX DOCD: CPT | Mod: CPTII,S$GLB,, | Performed by: FAMILY MEDICINE

## 2025-02-13 PROCEDURE — 80053 COMPREHEN METABOLIC PANEL: CPT | Performed by: FAMILY MEDICINE

## 2025-02-13 PROCEDURE — 36415 COLL VENOUS BLD VENIPUNCTURE: CPT | Performed by: FAMILY MEDICINE

## 2025-02-13 PROCEDURE — 99999 PR PBB SHADOW E&M-EST. PATIENT-LVL III: CPT | Mod: PBBFAC,,, | Performed by: FAMILY MEDICINE

## 2025-02-13 PROCEDURE — 3075F SYST BP GE 130 - 139MM HG: CPT | Mod: CPTII,S$GLB,, | Performed by: FAMILY MEDICINE

## 2025-02-13 PROCEDURE — 83735 ASSAY OF MAGNESIUM: CPT | Performed by: FAMILY MEDICINE

## 2025-02-13 PROCEDURE — 3078F DIAST BP <80 MM HG: CPT | Mod: CPTII,S$GLB,, | Performed by: FAMILY MEDICINE

## 2025-02-13 NOTE — PROGRESS NOTES
Subjective     Patient ID: Zoë Dupree is a 64 y.o. female.    Chief Complaint: Establish Care    HTN, low magnesium and low potassium: states she had chronic diarrhea from her PPI and then had a colonoscopy done, which she thinks was the final straw for the depletion. States since then she monitors her potassium and magnesium regularly. Pt takes pepcid and PPI will alternate.     States she's broken her feet three times this past year. States she wears her walking boot when she gets pain.     HM: colonoscopy at [facility]. Mammogram at [facility]. Dexa at [facility]-osteopenia.       Review of Systems   Constitutional:  Negative for activity change, appetite change, chills, diaphoresis, fatigue, fever and unexpected weight change.   Respiratory:  Negative for cough, choking and chest tightness.    Cardiovascular:  Negative for chest pain, palpitations, leg swelling and claudication.   Gastrointestinal:  Negative for abdominal pain, change in bowel habit, constipation, diarrhea, nausea and vomiting.   Psychiatric/Behavioral:  Negative for dysphoric mood, self-injury, sleep disturbance and suicidal ideas. The patient is not nervous/anxious.           Objective     Physical Exam  Vitals reviewed.   Constitutional:       General: She is not in acute distress.     Appearance: Normal appearance. She is normal weight. She is not ill-appearing or toxic-appearing.   Cardiovascular:      Rate and Rhythm: Normal rate and regular rhythm.      Heart sounds: Normal heart sounds.   Pulmonary:      Effort: Pulmonary effort is normal.      Breath sounds: Normal breath sounds.   Abdominal:      General: Abdomen is flat. Bowel sounds are normal.      Palpations: Abdomen is soft.      Tenderness: There is no abdominal tenderness.   Neurological:      General: No focal deficit present.      Mental Status: She is alert and oriented to person, place, and time.   Psychiatric:         Mood and Affect: Mood normal.         Behavior: Behavior  normal.            Assessment and Plan     1. Essential hypertension  -     Comprehensive Metabolic Panel; Future; Expected date: 02/13/2025  -     Lipid Panel; Future; Expected date: 02/13/2025  -     losartan (COZAAR) 100 MG tablet; Take 1 tablet (100 mg total) by mouth every morning.  Dispense: 90 tablet; Refill: 1    2. Low serum potassium    3. Hypomagnesemia  -     Magnesium; Future; Expected date: 02/13/2025    4. Essential hypertension  Comments:  starting on losartan  rechecking BP in 2 weeks  Orders:  -     Comprehensive Metabolic Panel; Future; Expected date: 02/13/2025  -     Lipid Panel; Future; Expected date: 02/13/2025  -     losartan (COZAAR) 100 MG tablet; Take 1 tablet (100 mg total) by mouth every morning.  Dispense: 90 tablet; Refill: 1    Other orders  -     famotidine (PEPCID) 40 MG tablet; Take 1 tablet (40 mg total) by mouth once daily.  Dispense: 90 tablet; Refill: 1  -     pantoprazole (PROTONIX) 20 MG tablet; Take 1 tablet (20 mg total) by mouth once daily.  Dispense: 90 tablet; Refill: 1        Will check labs today  Pt to return for fasting lipid  Refilled requested medications  Will try to obtain records from [facility]  Risks, benefits, and side effects were discussed with the patient. All questions were answered to the fullest satisfaction of the patient, and pt verbalized understanding and agreement to treatment plan. Pt was to call with any new or worsening symptoms, or present to the ER.  RTC 6 months or sooner if needed       Willow Zuñiga MD  Family Medicine Physician   [facility]     This note was created using M*[employer] voice recognition software that occasionally may misinterpret phrases or words.

## 2025-02-14 ENCOUNTER — PATIENT OUTREACH (OUTPATIENT)
Dept: ADMINISTRATIVE | Facility: HOSPITAL | Age: 65
End: 2025-02-14
Payer: MEDICARE

## 2025-02-14 RX ORDER — PANTOPRAZOLE SODIUM 20 MG/1
20 TABLET, DELAYED RELEASE ORAL DAILY
Qty: 90 TABLET | Refills: 1 | Status: SHIPPED | OUTPATIENT
Start: 2025-02-14

## 2025-02-14 RX ORDER — FAMOTIDINE 40 MG/1
40 TABLET, FILM COATED ORAL DAILY
Qty: 90 TABLET | Refills: 1 | Status: SHIPPED | OUTPATIENT
Start: 2025-02-14

## 2025-02-14 RX ORDER — LOSARTAN POTASSIUM 100 MG/1
100 TABLET ORAL EVERY MORNING
Qty: 90 TABLET | Refills: 1 | Status: SHIPPED | OUTPATIENT
Start: 2025-02-14

## 2025-02-14 NOTE — LETTER
AUTHORIZATION FOR RELEASE OF   CONFIDENTIAL INFORMATION    Dear [facility] Medical Records,    We are seeing Zoë Dupree, date of birth 1960, in the clinic at [facility]. Willow Zuñiga MD is the patient's PCP. Zoë Dupree has an outstanding lab/procedure at the time we reviewed her chart. In order to help keep her health information updated, she has authorized us to request the following medical record(s):        ( X )  MAMMOGRAM                                      ( X )  COLONOSCOPY      (  )  PAP SMEAR                                          (  )  OUTSIDE LAB RESULTS     ( X )  DEXA SCAN                                          (  )  EYE EXAM            (  )  FOOT EXAM                                          (  )  ENTIRE RECORD     (  )  OUTSIDE IMMUNIZATIONS                 ( X )LABS       Please fax records to [facility], Green, Cierra C., MD [phone]    Thanks so much and have a great day!    Danae Stephen LPN [facility]  [address] [address]  P- [phone]  F- [phone]           Patient Name: Zoë Dupree  : 1960  Patient Phone #: [phone]

## 2025-02-14 NOTE — PROGRESS NOTES
Population Health Chart Review & Patient Outreach Details      Additional Pop Health Notes:               Updates Requested / Reviewed:      Updated Care Coordination Note         Health Maintenance Topics Overdue:      VBHM Score: 2     Mammogram  Hemoglobin A1c    Pneumonia Vaccine  Shingles/Zoster Vaccine  RSV Vaccine                  Health Maintenance Topic(s) Outreach Outcomes & Actions Taken:    Breast Cancer Screening - Outreach Outcomes & Actions Taken  : External Records Requested & Care Team Updated if Applicable    Colorectal Cancer Screening - Outreach Outcomes & Actions Taken  : External Records Requested & Care Team Updated if Applicable    Lab(s) - Outreach Outcomes & Actions Taken  : External Records Requested & Care Team Updated if Applicable    Osteoporosis Screening - Outreach Outcomes & Actions Taken  : External Records Requested, Care Team Updated if Applicable